# Patient Record
Sex: FEMALE | Race: WHITE | HISPANIC OR LATINO | Employment: OTHER | URBAN - METROPOLITAN AREA
[De-identification: names, ages, dates, MRNs, and addresses within clinical notes are randomized per-mention and may not be internally consistent; named-entity substitution may affect disease eponyms.]

---

## 2023-10-12 ENCOUNTER — OFFICE VISIT (OUTPATIENT)
Dept: URGENT CARE | Facility: CLINIC | Age: 68
End: 2023-10-12
Payer: MEDICARE

## 2023-10-12 VITALS
RESPIRATION RATE: 20 BRPM | HEIGHT: 63 IN | TEMPERATURE: 99 F | OXYGEN SATURATION: 99 % | SYSTOLIC BLOOD PRESSURE: 114 MMHG | HEART RATE: 61 BPM | WEIGHT: 115 LBS | BODY MASS INDEX: 20.38 KG/M2 | DIASTOLIC BLOOD PRESSURE: 70 MMHG

## 2023-10-12 DIAGNOSIS — L08.9 INFECTED INSECT BITE OF RIGHT UPPER EXTREMITY, INITIAL ENCOUNTER: Primary | ICD-10-CM

## 2023-10-12 DIAGNOSIS — I49.9 CARDIAC ARRHYTHMIA, UNSPECIFIED CARDIAC ARRHYTHMIA TYPE: ICD-10-CM

## 2023-10-12 DIAGNOSIS — S40.861A INFECTED INSECT BITE OF RIGHT UPPER EXTREMITY, INITIAL ENCOUNTER: Primary | ICD-10-CM
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
## 2023-10-12 PROCEDURE — 96372 PR INJECTION,THERAP/PROPH/DIAG2ST, IM OR SUBCUT: ICD-10-PCS | Mod: S$GLB,,, | Performed by: NURSE PRACTITIONER

## 2023-10-12 PROCEDURE — 99204 OFFICE O/P NEW MOD 45 MIN: CPT | Mod: 25,S$GLB,, | Performed by: NURSE PRACTITIONER

## 2023-10-12 PROCEDURE — 96372 THER/PROPH/DIAG INJ SC/IM: CPT | Mod: S$GLB,,, | Performed by: NURSE PRACTITIONER

## 2023-10-12 PROCEDURE — 99204 PR OFFICE/OUTPT VISIT, NEW, LEVL IV, 45-59 MIN: ICD-10-PCS | Mod: 25,S$GLB,, | Performed by: NURSE PRACTITIONER

## 2023-10-12 RX ORDER — MUPIROCIN 20 MG/G
OINTMENT TOPICAL 2 TIMES DAILY
Qty: 15 G | Refills: 0 | Status: SHIPPED | OUTPATIENT
Start: 2023-10-12 | End: 2023-10-17

## 2023-10-12 RX ORDER — FAMOTIDINE 20 MG/1
20 TABLET, FILM COATED ORAL 2 TIMES DAILY
Qty: 4 TABLET | Refills: 0 | Status: SHIPPED | OUTPATIENT
Start: 2023-10-12 | End: 2024-02-14

## 2023-10-12 RX ORDER — DIPHENHYDRAMINE HCL 25 MG
25 CAPSULE ORAL 2 TIMES DAILY
Qty: 4 CAPSULE | Refills: 0 | Status: SHIPPED | OUTPATIENT
Start: 2023-10-12 | End: 2023-10-14

## 2023-10-12 RX ORDER — DEXAMETHASONE SODIUM PHOSPHATE 4 MG/ML
4 INJECTION, SOLUTION INTRA-ARTICULAR; INTRALESIONAL; INTRAMUSCULAR; INTRAVENOUS; SOFT TISSUE
Status: COMPLETED | OUTPATIENT
Start: 2023-10-12 | End: 2023-10-12

## 2023-10-12 RX ADMIN — DEXAMETHASONE SODIUM PHOSPHATE 4 MG: 4 INJECTION, SOLUTION INTRA-ARTICULAR; INTRALESIONAL; INTRAMUSCULAR; INTRAVENOUS; SOFT TISSUE at 10:10

## 2023-10-12 NOTE — PROGRESS NOTES
"Subjective:      Patient ID: Dayanara Flores is a 68 y.o. female.    Vitals:  height is 5' 3" (1.6 m) and weight is 52.2 kg (115 lb). Her temperature is 98.6 °F (37 °C). Her blood pressure is 114/70 and her pulse is 61. Her respiration is 20 and oxygen saturation is 99%.     Chief Complaint: Insect Bite    Insect Bite  This is a new problem. The current episode started yesterday. The problem has been gradually worsening. Pertinent negatives include no abdominal pain, arthralgias, chest pain, chills, congestion, coughing, fatigue, fever, headaches, joint swelling, myalgias, nausea, neck pain, numbness, rash, sore throat, vertigo, vomiting or weakness. Associated symptoms comments: Swelling and redness. She has tried NSAIDs for the symptoms. The treatment provided mild relief.       Constitution: Negative for activity change, appetite change, chills, fatigue, fever, unexpected weight change and generalized weakness.   HENT:  Negative for ear pain, ear discharge, foreign body in ear, tinnitus, hearing loss, dental problem, mouth sores, tongue pain, facial swelling, congestion, postnasal drip, sinus pain, sinus pressure, sore throat, trouble swallowing and voice change.    Neck: Negative for neck pain, neck stiffness and painful lymph nodes.   Cardiovascular:  Negative for chest pain, leg swelling, palpitations and sob on exertion.   Eyes:  Negative for eye trauma, eye discharge, eye itching, eye pain, eye redness, vision loss and eyelid swelling.   Respiratory:  Negative for chest tightness, cough, sputum production, COPD, shortness of breath, wheezing and asthma.    Gastrointestinal:  Negative for abdominal pain, nausea, vomiting, constipation, diarrhea, bright red blood in stool and dark colored stools.   Endocrine: hair loss, cold intolerance and heat intolerance.   Genitourinary:  Negative for dysuria, frequency, urgency and hematuria.   Musculoskeletal:  Negative for pain, trauma, joint pain, joint swelling, abnormal " ROM of joint and muscle ache.   Skin:  Positive for hives. Negative for color change, pale, rash and wound.   Allergic/Immunologic: Positive for hives. Negative for environmental allergies, seasonal allergies, food allergies, asthma and itching.   Neurological:  Negative for dizziness, history of vertigo, light-headedness, facial drooping, speech difficulty, headaches, disorientation, altered mental status, loss of consciousness and numbness.   Hematologic/Lymphatic: Negative for swollen lymph nodes and easy bruising/bleeding. Does not bruise/bleed easily.   Psychiatric/Behavioral:  Negative for altered mental status, disorientation, confusion, agitation, sleep disturbance and hallucinations.       Objective:     Physical Exam   Constitutional: She is oriented to person, place, and time. She appears well-developed. She is cooperative.   HENT:   Head: Normocephalic and atraumatic.   Ears:   Right Ear: Hearing, tympanic membrane, external ear and ear canal normal.   Left Ear: Hearing, tympanic membrane, external ear and ear canal normal.   Nose: Nose normal. No mucosal edema or nasal deformity. No epistaxis. Right sinus exhibits no maxillary sinus tenderness and no frontal sinus tenderness. Left sinus exhibits no maxillary sinus tenderness and no frontal sinus tenderness.   Mouth/Throat: Uvula is midline, oropharynx is clear and moist and mucous membranes are normal. No trismus in the jaw. Normal dentition. No uvula swelling.   Eyes: Conjunctivae and lids are normal.   Neck: Trachea normal and phonation normal. Neck supple.   Cardiovascular: Normal rate, normal heart sounds and normal pulses. An irregularly irregular rhythm present.   Pulmonary/Chest: Effort normal and breath sounds normal.   Abdominal: Normal appearance and bowel sounds are normal. Soft.   Musculoskeletal: Normal range of motion.         General: Normal range of motion.   Neurological: She is alert and oriented to person, place, and time. She  exhibits normal muscle tone.   Skin: Skin is warm, dry, intact and rash.        Psychiatric: Her speech is normal and behavior is normal. Judgment and thought content normal.   Nursing note and vitals reviewed.      Assessment:     1. Infected insect bite of right upper extremity, initial encounter    2. Cardiac arrhythmia, unspecified cardiac arrhythmia type        Plan:       Infected insect bite of right upper extremity, initial encounter  -     dexAMETHasone injection 4 mg administered in clinic  -     mupirocin (BACTROBAN) 2 % ointment; Apply topically 2 (two) times daily. for 5 days  Dispense: 15 g; Refill: 0  -     famotidine (PEPCID) 20 MG tablet; Take 1 tablet (20 mg total) by mouth 2 (two) times daily. for 2 days  Dispense: 4 tablet; Refill: 0  -     diphenhydrAMINE (BENADRYL) 25 mg capsule; Take 1 capsule (25 mg total) by mouth 2 (two) times a day. for 2 days  Dispense: 4 capsule; Refill: 0    Cardiac arrhythmia, unspecified cardiac arrhythmia type    Heart rhythm irregularly irregular noted during exam, patient denies chest pain or shortness a breath, vitals including pulse rate WNL, patient reports she was unaware that she had irregular heart rhythm, chart review reveals no previous encounters to review.  Patient instructed to follow up with PCP post discharge for further evaluation.    Utilize over-the-counter Tylenol or Motrin as directed for fever.    Ensure adequate fluid intake.    Return to clinic for new or worsening symptoms.  Patient is recommended to follow-up with their PCP post discharge.    Total time spent on med rec, H&P, with over half of the time in direct patient care: 36 minutes     Additional MDM:     Heart Failure Score:   COPD = No

## 2023-10-12 NOTE — PATIENT INSTRUCTIONS
Clean affected area twice daily and apply mupirocin ointment.    Follow up with primary care doctor in regards to irregular heart rhythm.    Thank you for the opportunity to care for you today.  Please take all medications as directed, and continue any previously prescribed medications unless we specifically discussed discontinuing them.  If your symptoms do not resolve or worsen please return to the clinic for re-evaluation.  If your situation becomes emergent, please present to the nearest emergency department.  Follow-up with your PCP for continued evaluation and management.

## 2024-02-14 ENCOUNTER — TELEPHONE (OUTPATIENT)
Dept: FAMILY MEDICINE | Facility: CLINIC | Age: 69
End: 2024-02-14

## 2024-02-14 ENCOUNTER — OFFICE VISIT (OUTPATIENT)
Dept: FAMILY MEDICINE | Facility: CLINIC | Age: 69
End: 2024-02-14
Payer: MEDICARE

## 2024-02-14 VITALS
SYSTOLIC BLOOD PRESSURE: 110 MMHG | HEART RATE: 61 BPM | WEIGHT: 115.31 LBS | HEIGHT: 63 IN | TEMPERATURE: 98 F | BODY MASS INDEX: 20.43 KG/M2 | OXYGEN SATURATION: 99 % | DIASTOLIC BLOOD PRESSURE: 68 MMHG

## 2024-02-14 DIAGNOSIS — Z13.220 SCREENING CHOLESTEROL LEVEL: ICD-10-CM

## 2024-02-14 DIAGNOSIS — Z78.0 ASYMPTOMATIC MENOPAUSAL STATE: ICD-10-CM

## 2024-02-14 DIAGNOSIS — Z76.89 ENCOUNTER TO ESTABLISH CARE: Primary | ICD-10-CM

## 2024-02-14 DIAGNOSIS — Z12.31 ENCOUNTER FOR SCREENING MAMMOGRAM FOR MALIGNANT NEOPLASM OF BREAST: ICD-10-CM

## 2024-02-14 DIAGNOSIS — Z00.00 ENCOUNTER FOR PREVENTIVE CARE: ICD-10-CM

## 2024-02-14 DIAGNOSIS — Z13.820 ENCOUNTER FOR SCREENING FOR OSTEOPOROSIS: ICD-10-CM

## 2024-02-14 DIAGNOSIS — R79.9 ABNORMAL FINDING OF BLOOD CHEMISTRY, UNSPECIFIED: ICD-10-CM

## 2024-02-14 PROCEDURE — 99204 OFFICE O/P NEW MOD 45 MIN: CPT | Mod: S$PBB,,, | Performed by: STUDENT IN AN ORGANIZED HEALTH CARE EDUCATION/TRAINING PROGRAM

## 2024-02-14 PROCEDURE — 99213 OFFICE O/P EST LOW 20 MIN: CPT | Mod: PBBFAC,PO | Performed by: STUDENT IN AN ORGANIZED HEALTH CARE EDUCATION/TRAINING PROGRAM

## 2024-02-14 PROCEDURE — 99999 PR PBB SHADOW E&M-EST. PATIENT-LVL III: CPT | Mod: PBBFAC,,, | Performed by: STUDENT IN AN ORGANIZED HEALTH CARE EDUCATION/TRAINING PROGRAM

## 2024-02-14 RX ORDER — LANOLIN ALCOHOL/MO/W.PET/CERES
100 CREAM (GRAM) TOPICAL DAILY
COMMUNITY

## 2024-02-14 RX ORDER — CALCIUM CARBONATE 600 MG
600 TABLET ORAL ONCE
COMMUNITY

## 2024-02-14 RX ORDER — GLUCOSAMINE/CHONDRO SU A 500-400 MG
1 TABLET ORAL 3 TIMES DAILY
COMMUNITY

## 2024-02-14 RX ORDER — CHOLECALCIFEROL (VITAMIN D3) 25 MCG
1000 TABLET ORAL DAILY
COMMUNITY

## 2024-02-14 NOTE — PROGRESS NOTES
[facility] Note    Subjective:  Chief Complaint:   Chief Complaint   Patient presents with    Establish Care       History of Present Illness:  Dayanara is here for establishing care   No daily medications other than supplements   Moved from [place]     Mammo due   DEXA at 65 yo - reports osteopenia , due repeat   CRC Screening - reports normal q 10 years, due at 70 yo   Recommend COVID, Shingles, RSV, Pneumococcal ([id number]) vaccinations. Will bring vaccination records.     Screening  Health Maintenance         Date Due Completion Date    Hepatitis C Screening Never done ---    Lipid Panel Never done ---    COVID-19 Vaccine (1) Never done ---    TETANUS VACCINE Never done ---    Mammogram Never done ---    DEXA Scan Never done ---    Colorectal Cancer Screening Never done ---    Shingles Vaccine (1 of 2) Never done ---    RSV Vaccine (Age 60+ and Pregnant patients) (1 - 1-dose 60+ series) Never done ---    Pneumococcal Vaccines (Age 65+) (1 of 1 - PCV) Never done ---            There is no immunization history on file for this patient.  The ASCVD Risk score (Luigi ORTIZ, et al., 2019) failed to calculate for the following reasons:    Cannot find a previous HDL lab    Cannot find a previous total cholesterol lab    The smoking status is invalid    Allergies:  Review of patient's allergies indicates:  No Known Allergies    Home Medications:  Current Outpatient Medications on File Prior to Visit   Medication Sig    calcium carbonate (OS-ILA) 600 mg calcium (1,500 mg) Tab Take 600 mg by mouth once.    cyanocobalamin (VITAMIN B-12) 1000 MCG tablet Take 100 mcg by mouth once daily.    glucosamine-chondroitin 500-400 mg tablet Take 1 tablet by mouth 3 (three) times daily.    vitamin D (VITAMIN D3) 1000 units Tab Take 1,000 Units by mouth once daily.    [DISCONTINUED] famotidine (PEPCID) 20 MG tablet Take 1 tablet (20 mg total) by mouth 2 (two) times daily. for 2 days     No current facility-administered  "medications on file prior to visit.       No past medical history on file.  No past surgical history on file.  No family history on file.            The patient's past medical history, surgical history, social history, family history, allergies and medications have been reviewed.    Review of Systems     10 point review of systems was conducted and only the pertinent positives and pertinent negatives are noted above in the HPI section.    Physical Examination  General appearance: alert, cooperative, no distress  HEENT: normocephalic, atraumatic, PERRLA   Neck: trachea midline,   no neck stiffness  Lungs: clear to auscultation, no wheezes, rales or rhonchi, symmetric air entry  Heart: normal rate, regular rhythm, normal S1, S2, no murmurs, rubs, clicks or gallops  Abdomen: soft, nontender, nondistended, no rigidity, rebound, or guarding.   Skin: No rashes or abnormal skin lesions, no apparent jaundice or bruising  Extremities: Full ROM of all extremities, peripheral pulses normal, no unilateral leg swelling or calf tenderness   Neurological:alert, oriented, normal speech, no new focal findings or movement disorder noted from baseline      BP Readings from Last 3 Encounters:   02/14/24 110/68   10/12/23 114/70     Wt Readings from Last 3 Encounters:   02/14/24 52.3 kg (115 lb 4.8 oz)   10/12/23 52.2 kg (115 lb)     /68 (BP Location: Left arm, Patient Position: Sitting, BP Method: Medium (Manual))   Pulse 61   Temp 97.8 °F (36.6 °C) (Oral)   Ht 5' 3" (1.6 m)   Wt 52.3 kg (115 lb 4.8 oz)   SpO2 99%   BMI 20.42 kg/m²       274}  Laboratory: I have reviewed old labs below:       274}    No results found for: "WBC", "HGB", "HCT", "MCV", "PLT", "NA", "K", "CL", "CALCIUM", "PHOS", "CO2", "GLU", "BUN", "CREATININE", "EGFRNORACEVR", "ANIONGAP", "PROT", "ALBUMIN", "BILITOT", "ALKPHOS", "ALT", "AST", "INR", "CHOL", "TRIG", "HDL", "LDLCALC", "TSH", "PSA", "GLUF", "HGBA1C", "MICROALBUR"   Lab reviewed by me: Particular " labs of significance that I will monitor, workup, or treat to improve are mentioned below in diagnostic impression remarks.    Imaging/EKG: I have reviewed the pertinent results and my findings are noted in remarks.     274}    CC:   Chief Complaint   Patient presents with    Establish Care           274}    Assessment/Plan  Dayanara Flores is a 68 y.o. female who presents to clinic with:  1. Encounter to establish care    2. Encounter for preventive care    3. Screening cholesterol level    4. Encounter for screening mammogram for malignant neoplasm of breast    5. Encounter for screening for osteoporosis    6. Abnormal finding of blood chemistry, unspecified    7. Asymptomatic menopausal state          274}  Diagnostic Impression Remarks       68 y.o. female who presents to clinic today for establishing care     This is the extent of this pleasant patient's concerns at this present time.  I also discussed the importance of close follow up to discuss labs, change or modify her medications if needed, monitor side effects, and further evaluation of medical problems.     Additional workup planned: see labs ordered below.    See below for labs and meds ordered with associated diagnosis      1. Encounter to establish care    2. Encounter for preventive care  - CBC Auto Differential; Future  - Comprehensive Metabolic Panel; Future  - Lipid Panel; Future    3. Screening cholesterol level  - Lipid Panel; Future    4. Encounter for screening mammogram for malignant neoplasm of breast  - Mammo Digital Screening Bilat w/ [place]; Future    5. Encounter for screening for osteoporosis  - DXA Bone Density Axial Skeleton 1 or more sites; Future    6. Abnormal finding of blood chemistry, unspecified  - CBC Auto Differential; Future  - Lipid Panel; Future    7. Asymptomatic menopausal state  - DXA Bone Density Axial Skeleton 1 or more sites; Future      RTC annually or PRN     Sue Bustillos MD, [facility]   Family Medicine  Physician  02/14/2024      If you are due for any health screening(s) below please notify me so we can arrange them to be ordered and scheduled to maintain your health.     Health Maintenance Due   Topic    Lipid Panel     Mammogram     Colorectal Cancer Screening        Breast Cancer Screening    Breast cancer is the second most common cancer in women after skin cancer, and the second leading cause of death from cancer after lung cancer. Mammograms can detect breast cancer early, which significantly increases the chances of curing the cancer.      A screening mammogram is an x-ray image of the breasts used for early breast cancer detection. It can help reduce the number of deaths from breast cancer among women. To get a clear image, the breast is placed between two plastic plates to make it flat. How often a mammogram is needed depends on your age and your breast cancer risk.    Colon Cancer Screening    Of cancers affecting both men and women, colorectal cancer is the third leading cancer killer in the [place]. But it doesnt have to be. Screening can prevent colorectal cancer or find it at an early stage when treatment often leads to a cure.    A colonoscopy is the preferred test for detecting colon cancer. It is needed only once every 10 years if results are negative. While sedated, a flexible, lighted tube with a tiny camera is inserted into the rectum and advanced through the colon to look for cancers. An alternative screening test that is used at home and returned to the lab may also be used. It detects hidden blood in bowel movements which could indicate cancer in the colon. If results are positive, you will need a colonoscopy to determine if the blood is a sign of cancer. This type of follow up (diagnostic) colonoscopy usually requires additional copays as required by your insurance provider. Please contact your PCP if you have any questions.              The following information is provided to all  patients.  This information is to help you find resources for any of the problems found today that may be affecting your health:                Living healthy guide: www.[facility].[email or web address].[email or web address]       Understanding Diabetes: www.diabetes.org       Eating healthy: www.cdc.gov/healthyweight      CDC home safety checklist: www.cdc.gov/steadi/patient.html      Agency on Aging: www.goea.[place].[facility]       Alcoholics anonymous (AA): www.aa.org      Physical Activity: www.chano.nih.gov/hz2uctd       Tobacco use: www.quitwithusla.org

## 2024-02-14 NOTE — PATIENT INSTRUCTIONS
Hubert Nichole,     If you are due for any health screening(s) below please notify me so we can arrange them to be ordered and scheduled. Most healthy patients at your age complete them, but you are free to accept or refuse.     If you can't do it, I'll definitely understand. If you can, I'd certainly appreciate it!    Tests to Keep You Healthy    Mammogram: DUE  Colon Cancer Screening: DUE      Schedule your breast cancer screening today     Breast cancer is the second most common cancer in women,  and the second leading cause of death from cancer. Mammograms can detect breast cancer early, which significantly increases the chances of curing the cancer.       Our records indicate that you may be overdue for breast cancer screening. Cancer screenings save lives, so schedule yours today to stay healthy.     If you recently had a mammogram performed outside of [facility], please let your Health care team know so that they can update your health record.        Its time for your colon cancer screening     Colorectal cancer is one of the leading causes of cancer death for men and women but it doesnt have to be. Screenings can prevent colorectal cancer or find it early enough to treat and cure the disease.     Our records indicate that you may be overdue for colon cancer screening. A colonoscopy or stool screening test can help identify patients at risk for developing colon cancer. Cancer screenings save lives, so schedule yours today to stay healthy.     A colonoscopy is the preferred test for detecting colon cancer. It is needed only once every 10 years if results are negative. While you are sedated, a flexible, lighted tube with a tiny camera is inserted into the rectum and advanced through the colon to look for cancers.     An alternative screening test that is used at home and returned to the lab may also be used. It detects hidden blood in bowel movements which could indicate cancer in the colon. If results  are positive, you will need a colonoscopy to determine if the blood is a sign of cancer. This type of follow up (diagnostic) colonoscopy usually requires additional copays as required by your insurance provider.     If you recently had your colon cancer screening performed outside of [facility], please let your Health care team know so that they can update your health record. Please contact your PCP if you have any questions.         Recommend COVID booster, Shingles, RSV, Pneumococcal ([id number]) vaccinations.

## 2024-02-14 NOTE — TELEPHONE ENCOUNTER
Patient calling to let you know that her brother and sister have both have pancreatic caner. Wanted to make you aware incase you want to order her testing.

## 2024-02-14 NOTE — TELEPHONE ENCOUNTER
----- Message from Elise Bandar sent at 2/14/2024  1:08 PM CST -----  Contact: patient  Type:  Needs Medical Advice    Who Called: patient     Would the patient rather a call back or a response via [facility]? Call     Best Call Back Number: [phone] (home)      Additional Information: patient would like to speak with the nurse in regards to letting the nurse know that cancer runs in her family and she forgot to mention it at her visit today.     Please call to advise

## 2024-02-15 ENCOUNTER — HOSPITAL ENCOUNTER (OUTPATIENT)
Dept: RADIOLOGY | Facility: CLINIC | Age: 69
Discharge: HOME | End: 2024-02-15
Attending: STUDENT IN AN ORGANIZED HEALTH CARE EDUCATION/TRAINING PROGRAM
Payer: MEDICARE

## 2024-02-15 ENCOUNTER — TELEPHONE (OUTPATIENT)
Dept: GASTROENTEROLOGY | Facility: CLINIC | Age: 69
End: 2024-02-15
Payer: MEDICARE

## 2024-02-15 DIAGNOSIS — Z78.0 ASYMPTOMATIC MENOPAUSAL STATE: ICD-10-CM

## 2024-02-15 DIAGNOSIS — Z12.31 ENCOUNTER FOR SCREENING MAMMOGRAM FOR MALIGNANT NEOPLASM OF BREAST: ICD-10-CM

## 2024-02-15 DIAGNOSIS — Z80.0 FHX: PANCREATIC CANCER: Primary | ICD-10-CM

## 2024-02-15 DIAGNOSIS — Z13.820 ENCOUNTER FOR SCREENING FOR OSTEOPOROSIS: ICD-10-CM

## 2024-02-15 PROCEDURE — 77063 BREAST TOMOSYNTHESIS BI: CPT | Mod: 26,,, | Performed by: RADIOLOGY

## 2024-02-15 PROCEDURE — 77067 SCR MAMMO BI INCL CAD: CPT | Mod: 26,,, | Performed by: RADIOLOGY

## 2024-02-15 PROCEDURE — 77080 DXA BONE DENSITY AXIAL: CPT | Mod: 26,,, | Performed by: RADIOLOGY

## 2024-02-15 PROCEDURE — 77080 DXA BONE DENSITY AXIAL: CPT | Mod: TC,PO

## 2024-02-15 PROCEDURE — 77067 SCR MAMMO BI INCL CAD: CPT | Mod: TC,PO

## 2024-02-19 ENCOUNTER — TELEPHONE (OUTPATIENT)
Dept: FAMILY MEDICINE | Facility: CLINIC | Age: 69
End: 2024-02-19
Payer: MEDICARE

## 2024-02-19 NOTE — TELEPHONE ENCOUNTER
----- Message from Kelly Doshi sent at 2/19/2024  2:48 PM CST -----  Regarding: return call  Contact: patient  Type:  Patient Returning Call    Who Called:  patient  Who Left Message for Patient:  unknown  Does the patient know what this is regarding?:  lab results  Best Call Back Number:  [phone] (home)     Additional Information:  Please call patient to advise.  Thanks!

## 2024-02-19 NOTE — TELEPHONE ENCOUNTER
Called patient to advise labs have not been reviewed by Dr Bustillos as of yet. As soon as she returns she will review. No answer, left voicemail to return call.

## 2024-02-22 ENCOUNTER — TELEPHONE (OUTPATIENT)
Dept: FAMILY MEDICINE | Facility: CLINIC | Age: 69
End: 2024-02-22

## 2024-02-22 ENCOUNTER — OFFICE VISIT (OUTPATIENT)
Dept: FAMILY MEDICINE | Facility: CLINIC | Age: 69
End: 2024-02-22
Payer: MEDICARE

## 2024-02-22 VITALS
HEART RATE: 72 BPM | TEMPERATURE: 98 F | WEIGHT: 114.63 LBS | HEIGHT: 63 IN | SYSTOLIC BLOOD PRESSURE: 110 MMHG | DIASTOLIC BLOOD PRESSURE: 64 MMHG | OXYGEN SATURATION: 98 % | BODY MASS INDEX: 20.31 KG/M2

## 2024-02-22 DIAGNOSIS — M81.0 AGE-RELATED OSTEOPOROSIS WITHOUT CURRENT PATHOLOGICAL FRACTURE: ICD-10-CM

## 2024-02-22 DIAGNOSIS — M81.0 AGE-RELATED OSTEOPOROSIS WITHOUT CURRENT PATHOLOGICAL FRACTURE: Primary | ICD-10-CM

## 2024-02-22 PROCEDURE — 99213 OFFICE O/P EST LOW 20 MIN: CPT | Mod: S$PBB,,, | Performed by: STUDENT IN AN ORGANIZED HEALTH CARE EDUCATION/TRAINING PROGRAM

## 2024-02-22 PROCEDURE — 99999 PR PBB SHADOW E&M-EST. PATIENT-LVL III: CPT | Mod: PBBFAC,,, | Performed by: STUDENT IN AN ORGANIZED HEALTH CARE EDUCATION/TRAINING PROGRAM

## 2024-02-22 PROCEDURE — 99213 OFFICE O/P EST LOW 20 MIN: CPT | Mod: PBBFAC,PO | Performed by: STUDENT IN AN ORGANIZED HEALTH CARE EDUCATION/TRAINING PROGRAM

## 2024-02-22 RX ORDER — ALENDRONATE SODIUM 70 MG/1
70 TABLET ORAL
Qty: 4 TABLET | Refills: 11 | Status: SHIPPED | OUTPATIENT
Start: 2024-02-22 | End: 2025-02-21

## 2024-02-22 RX ORDER — IBANDRONATE SODIUM 150 MG/1
150 TABLET, FILM COATED ORAL
Qty: 3 TABLET | Refills: 3 | Status: SHIPPED | OUTPATIENT
Start: 2024-02-22 | End: 2024-02-22

## 2024-02-22 RX ORDER — ALENDRONATE SODIUM 70 MG/1
70 TABLET ORAL
Qty: 12 TABLET | Refills: 3 | Status: CANCELLED | OUTPATIENT
Start: 2024-02-22 | End: 2025-02-21

## 2024-02-22 NOTE — TELEPHONE ENCOUNTER
----- Message from Ruchi Hidalgo MA sent at 2/22/2024 11:58 AM CST -----  Regarding: FW: medication  Please advise.   ----- Message -----  From: Jessica Urrutia  Sent: 2/22/2024  11:53 AM CST  To: [facility] Staff  Subject: medication                                       Patient was just seen her prescription is not covered by her insurance she would like to know is their any other options for other medication to be filled

## 2024-02-22 NOTE — TELEPHONE ENCOUNTER
----- Message from Sue Bustillos MD sent at 2/22/2024  1:19 PM CST -----  Regarding: RE: medication  Ordered Fosamax, which we had also discussed during the visit. Please reach out to patient and let her know.       ----- Message -----  From: Ruchi Hidalgo MA  Sent: 2/22/2024  11:58 AM CST  To: Sue Bustillos MD  Subject: FW: medication                                   Please advise.   ----- Message -----  From: Jessica Urrutia  Sent: 2/22/2024  11:53 AM CST  To: [facility] Staff  Subject: medication                                       Patient was just seen her prescription is not covered by her insurance she would like to know is their any other options for other medication to be filled

## 2024-02-22 NOTE — TELEPHONE ENCOUNTER
Spoke to patient and advised that a different medication was called in. Advised her if the pharmacy says that this one is not covered to find out which one would be covered and let us know.

## 2024-02-22 NOTE — PROGRESS NOTES
[facility]    Subjective:  Chief Complaint:   Chief Complaint   Patient presents with    Follow-up       History of Present Illness:  Dayanara is here for *    Osteoporosis, new diagnosis  Previously osteopenia, adherent to calcium and vit d supplements   Patient reports previously being on Boniva, but was able to discontinue, tolerated well     DEXA (2/15/24)  FINDINGS:  The L1 to L4 vertebral bone mineral density is equal to 0.706 g/cm squared with a T score of -3.1.     The left femoral neck bone mineral density is equal to 0.58 g/cm squared with a T score of -2.4.     The total hip bone mineral density is equal to 0.648 g/cm squared with a T score of -2.4.     There is a 12% risk of a major osteoporotic fracture and a 2.9% risk of hip fracture in the next 10 years (FRAX).     Impression:  Osteoporosis with A T-score of -3.1      Allergies:  Review of patient's allergies indicates:  No Known Allergies    Home Medications:  Current Outpatient Medications on File Prior to Visit   Medication Sig    calcium carbonate (OS-ILA) 600 mg calcium (1,500 mg) Tab Take 600 mg by mouth once.    cyanocobalamin (VITAMIN B-12) 1000 MCG tablet Take 100 mcg by mouth once daily.    glucosamine-chondroitin 500-400 mg tablet Take 1 tablet by mouth 3 (three) times daily.    vitamin D (VITAMIN D3) 1000 units Tab Take 1,000 Units by mouth once daily.     No current facility-administered medications on file prior to visit.       No past medical history on file.  No past surgical history on file.  Family History   Problem Relation Age of Onset    Cancer Sister     Cancer Brother      Social History     Tobacco Use    Smoking status: Never    Smokeless tobacco: Never            The patient's past medical history, surgical history, social history, family history, allergies and medications have been reviewed.    Review of Systems     10 point review of systems was conducted and only the pertinent positives and pertinent  "negatives are noted above in the HPI section.    Physical Examination  General appearance: alert, cooperative, no distress  HEENT: normocephalic, atraumatic, PERRLA   Neck: trachea midline, no neck stiffness  Lungs: normal effort   Skin: No rashes or abnormal skin lesions, no apparent jaundice or bruising  Extremities: Full ROM of all extremities    Neurological:alert, oriented, normal speech, no new focal findings or movement disorder noted from baseline      BP Readings from Last 3 Encounters:   02/22/24 110/64   02/14/24 110/68   10/12/23 114/70     Wt Readings from Last 3 Encounters:   02/22/24 52 kg (114 lb 10.2 oz)   02/14/24 52.3 kg (115 lb 4.8 oz)   10/12/23 52.2 kg (115 lb)     /64 (BP Location: Left arm, Patient Position: Sitting, BP Method: Medium (Manual))   Pulse 72   Temp 97.8 °F (36.6 °C) (Oral)   Ht 5' 3" (1.6 m)   Wt 52 kg (114 lb 10.2 oz)   SpO2 98%   BMI 20.31 kg/m²       274}  Laboratory: I have reviewed old labs below:       274}    Lab Results   Component Value Date    WBC 6.66 02/15/2024    HGB 13.7 02/15/2024    HCT 42.5 02/15/2024    MCV 92 02/15/2024     02/15/2024     02/15/2024    K 4.7 02/15/2024     02/15/2024    CALCIUM 10.1 02/15/2024    CO2 28 02/15/2024     02/15/2024    BUN 17 02/15/2024    CREATININE 0.7 02/15/2024    EGFRNORACEVR >60.0 02/15/2024    ANIONGAP 9 02/15/2024    PROT 7.3 02/15/2024    ALBUMIN 4.1 02/15/2024    BILITOT 0.6 02/15/2024    ALKPHOS 78 02/15/2024    ALT 18 02/15/2024    AST 22 02/15/2024    CHOL 200 (H) 02/15/2024    TRIG 57 02/15/2024    HDL 86 (H) 02/15/2024    LDLCALC 102.6 02/15/2024      Lab reviewed by me: Particular labs of significance that I will monitor, workup, or treat to improve are mentioned below in diagnostic impression remarks.    Imaging/EKG: I have reviewed the pertinent results and my findings are noted in remarks.     274}    CC:   Chief Complaint   Patient presents with    Follow-up           " 274}    Assessment/Plan  Dayanara Flores is a 68 y.o. female who presents to clinic with:  1. Age-related osteoporosis without current pathological fracture          274}  Diagnostic Impression Remarks       68 y.o. female who presents to clinic today for osteoporosis follow up     This is the extent of this pleasant patient's concerns at this present time.  I also discussed the importance of close follow up to discuss labs, change or modify her medications if needed, monitor side effects, and further evaluation of medical problems.     Additional workup planned: see labs ordered below.    See below for labs and meds ordered with associated diagnosis      1. Age-related osteoporosis without current pathological fracture  - ibandronate (BONIVA) 150 mg tablet; Take 1 tablet (150 mg total) by mouth every 30 days.  Dispense: 3 tablet; Refill: 3    150 mg orally once monthly on the same day each month; take with 6 to 8 ounces of plain water at least 60 minute before the first food or drink of the day or any other medications, and remain in a standing or upright position for 60 minutes after administration   Calcium and other mineral (eg, magnesium, iron) supplements can interfere with the absorption of bisphosphonates, and they should not be taken for at least one hour after taking oral bisphosphonates.    DEXA scan shows osteoporosis at vertebrate   DEXA scan shows osteopenia at hip and spine.  I recommend getting 800 international units of vitamin-D and 1200 mg of calcium daily in the diet. Continue current supplementation.    I would recommend continuing to get as much daily weight-bearing low-impact aerobic exercise as possible.    Low bone mass and 10 yr Fracture risk goes above 20% for vertebrae or 3% for hip, recommend a bisphosphonate (Boniva, Fosamax, or actonel) for additional Fracture risk reduction.    All of the bisphosphonates, except etidronate, have a statistically significant impact on fracture reduction  within 1 year of treatment initiation. Therefore, every woman 70 years of age or older who expects to live more than 1 year and has a desire to reduce her risk of fracture should consider bisphosphonate therapy, along with sufficient vitamin D (7898-8815 IU/day) and calcium (0757-1533 mg elemental calcium per day via supplements and dietary sources combined). In various prospective clinical trials, bisphosphonates, in particular alendronate, risedronate, and zoledronic acid, have reduced vertebral, nonvertebral, and hip fractures in study populations with an average age of 68 years or older at the time of enrolment. (NCBI)  https://www.ncbi.nlm.nih.gov/pmc/articles/PHH6910791/    Common side effects include nausea, reflux and esophageal inflammation.     Follow up imaging is recommended every 2 years.  RTC RPCYRUS Bustillos MD, [facility]   Family Medicine Physician  02/22/2024

## 2024-02-22 NOTE — TELEPHONE ENCOUNTER
Patient reports Boniva not covered by insurance despite this being the medication she had previously been taking.     Will go with original plan to prescribe Fosamax.       Sue Bustillos MD, [facility]   Family Medicine Physician  02/22/2024

## 2024-02-23 ENCOUNTER — TELEPHONE (OUTPATIENT)
Dept: FAMILY MEDICINE | Facility: CLINIC | Age: 69
End: 2024-02-23

## 2024-02-27 ENCOUNTER — OFFICE VISIT (OUTPATIENT)
Dept: GASTROENTEROLOGY | Facility: CLINIC | Age: 69
End: 2024-02-27
Payer: MEDICARE

## 2024-02-27 ENCOUNTER — TELEPHONE (OUTPATIENT)
Dept: FAMILY MEDICINE | Facility: CLINIC | Age: 69
End: 2024-02-27
Payer: MEDICARE

## 2024-02-27 VITALS
DIASTOLIC BLOOD PRESSURE: 63 MMHG | SYSTOLIC BLOOD PRESSURE: 148 MMHG | BODY MASS INDEX: 20.08 KG/M2 | HEIGHT: 63 IN | HEART RATE: 71 BPM | WEIGHT: 113.31 LBS

## 2024-02-27 DIAGNOSIS — Z80.0 FAMILY HISTORY OF PANCREATIC CANCER: Primary | ICD-10-CM

## 2024-02-27 DIAGNOSIS — Z12.11 SCREENING FOR MALIGNANT NEOPLASM OF COLON: ICD-10-CM

## 2024-02-27 PROCEDURE — 99213 OFFICE O/P EST LOW 20 MIN: CPT | Mod: PBBFAC,PN

## 2024-02-27 PROCEDURE — 99214 OFFICE O/P EST MOD 30 MIN: CPT | Mod: S$PBB,,,

## 2024-02-27 PROCEDURE — 99999 PR PBB SHADOW E&M-EST. PATIENT-LVL III: CPT | Mod: PBBFAC,,,

## 2024-02-27 NOTE — TELEPHONE ENCOUNTER
----- Message from Elise Portillo sent at 2/27/2024  1:52 PM CST -----  Contact: patient  Type:  Needs Medical Advice    Who Called: patient     Would the patient rather a call back or a response via [facility]? Call     Best Call Back Number: [phone] (home)      Additional Information: patient would like to speak with the nurse in regards to her bone density medication.     Please call to advise

## 2024-02-27 NOTE — PROGRESS NOTES
Subjective:       Patient ID: Dayanara Folres is a 68 y.o. female Body mass index is 20.07 kg/m².    Chief Complaint: Advice Only (Family hx of pancreatic ca)    This patient is new to me.  Referring Provider: Dr. Sue Bustillos for family hx of pancreatic cancer.       GI Problem  Primary symptoms do not include fever, weight loss, fatigue, abdominal pain, nausea, vomiting, diarrhea, melena, hematemesis, jaundice, hematochezia, dysuria, myalgias, arthralgias or rash.   The illness does not include constipation (Has a bm daily, no straining, on a high fiber diet, rates stool type 4 on bristol stool scale,). Associated symptoms comments: Patient reports she is asymptomatic doing well overall, patient concerned with a family history of pancreatic cancer reports her younger brother recently passed away from pancreatic cancer last year and he was diagnosed in his early 50s, she mentions also sister was recently diagnosed at the age of 77.  Patient at clinic visit to see if there is any screening she can do for pancreatic cancer.  Patient reports she has had a colonoscopy in the past believes it has been over 10 years in [place] denies history of colon polyp, denies any family history of colon cancer or other gastrointestinal cancers  . Associated medical issues do not include inflammatory bowel disease, GERD, gallstones, liver disease, alcohol abuse, PUD, gastric bypass, bowel resection, irritable bowel syndrome, hemorrhoids or diverticulitis.       Review of Systems   Constitutional:  Negative for fatigue, fever and weight loss.   Gastrointestinal:  Negative for abdominal distention, abdominal pain, anal bleeding, blood in stool, constipation (Has a bm daily, no straining, on a high fiber diet, rates stool type 4 on bristol stool scale,), diarrhea, hematemesis, hematochezia, jaundice, melena, nausea, rectal pain and vomiting.   Genitourinary:  Negative for dysuria.   Musculoskeletal:  Negative for arthralgias and  myalgias.   Skin:  Negative for rash.         No LMP recorded. Patient is postmenopausal.  No past medical history on file.  Past Surgical History:   Procedure Laterality Date    APPENDECTOMY      COLONOSCOPY      10 years ago in [place]    UPPER GASTROINTESTINAL ENDOSCOPY      10+ years ago     Family History   Problem Relation Age of Onset    Cancer Sister     Cancer Brother     Colon cancer Neg Hx     Esophageal cancer Neg Hx     Liver cancer Neg Hx     Stomach cancer Neg Hx      Social History     Tobacco Use    Smoking status: Never    Smokeless tobacco: Never     Wt Readings from Last 10 Encounters:   02/27/24 51.4 kg (113 lb 5.1 oz)   02/22/24 52 kg (114 lb 10.2 oz)   02/14/24 52.3 kg (115 lb 4.8 oz)   10/12/23 52.2 kg (115 lb)     Lab Results   Component Value Date    WBC 6.66 02/15/2024    HGB 13.7 02/15/2024    HCT 42.5 02/15/2024    MCV 92 02/15/2024     02/15/2024     CMP  Sodium   Date Value Ref Range Status   02/15/2024 142 136 - 145 mmol/L Final     Potassium   Date Value Ref Range Status   02/15/2024 4.7 3.5 - 5.1 mmol/L Final     Chloride   Date Value Ref Range Status   02/15/2024 105 95 - 110 mmol/L Final     CO2   Date Value Ref Range Status   02/15/2024 28 23 - 29 mmol/L Final     Glucose   Date Value Ref Range Status   02/15/2024 103 70 - 110 mg/dL Final     BUN   Date Value Ref Range Status   02/15/2024 17 8 - 23 mg/dL Final     Creatinine   Date Value Ref Range Status   02/15/2024 0.7 0.5 - 1.4 mg/dL Final     Calcium   Date Value Ref Range Status   02/15/2024 10.1 8.7 - 10.5 mg/dL Final     Total Protein   Date Value Ref Range Status   02/15/2024 7.3 6.0 - 8.4 g/dL Final     Albumin   Date Value Ref Range Status   02/15/2024 4.1 3.5 - 5.2 g/dL Final     Total Bilirubin   Date Value Ref Range Status   02/15/2024 0.6 0.1 - 1.0 mg/dL Final     Comment:     For infants and newborns, interpretation of results should be based  on gestational age, weight and in agreement with  "clinical  observations.    Premature Infant recommended reference ranges:  Up to 24 hours.............<8.0 mg/dL  Up to 48 hours............<12.0 mg/dL  3-5 days..................<15.0 mg/dL  6-29 days.................<15.0 mg/dL       Alkaline Phosphatase   Date Value Ref Range Status   02/15/2024 78 55 - 135 U/L Final     AST   Date Value Ref Range Status   02/15/2024 22 10 - 40 U/L Final     ALT   Date Value Ref Range Status   02/15/2024 18 10 - 44 U/L Final     Anion Gap   Date Value Ref Range Status   02/15/2024 9 8 - 16 mmol/L Final     No results found for: "LIPASE"  No results found for: "LIPASERES"  No results found for: "TSH"    Reviewed prior medical records including radiology report of & endoscopy history (see surgical history/procedures).    Objective:      Physical Exam  Vitals and nursing note reviewed.   Constitutional:       Appearance: Normal appearance. She is normal weight.   Cardiovascular:      Rate and Rhythm: Normal rate and regular rhythm.      Heart sounds: Normal heart sounds.   Pulmonary:      Breath sounds: Normal breath sounds.   Abdominal:      General: Bowel sounds are normal.      Palpations: Abdomen is soft.      Tenderness: There is no abdominal tenderness.   Skin:     General: Skin is warm and dry.      Coloration: Skin is not jaundiced.   Neurological:      Mental Status: She is alert and oriented to person, place, and time.   Psychiatric:         Mood and Affect: Mood normal.         Behavior: Behavior normal.         Assessment:       1. Family history of pancreatic cancer    2. Screening for malignant neoplasm of colon        Plan:       Family history of pancreatic cancer  -     Lipase; Future; Expected date: 02/27/2024  -     Ambulatory referral/consult to Genetics; Future; Expected date: 03/05/2024  -     CT Abdomen Pelvis With IV Contrast Routine Oral Contrast; Future; Expected date: 02/27/2024    Screening for malignant neoplasm of colon  -     Case Request Endoscopy: " COLONOSCOPY  - schedule Colonoscopy, discussed procedure with the patient, including risks and benefits, patient verbalized understanding      Follow up if symptoms worsen or fail to improve.      If no improvement in symptoms or symptoms worsen, call/follow-up at clinic or go to ER.       [facility]  [facility] [place]     Dictation software program was used for this note. Please expect some simple typographical  errors in this note.    Encounter includes face to face time and non-face to face time preparing to see the patient (eg, review of tests), obtaining and/or reviewing separately obtained history, documenting clinical information in the electronic or other health record, independently interpreting results (not separately reported) and communicating results to the patient/family/caregiver, or care coordination (not separately reported).

## 2024-02-27 NOTE — TELEPHONE ENCOUNTER
Returned call in regards to questions for Fosamax. Advised patient that she can start taking her fosamax now, and once she schedules an appointment with her dentist she can give us a call in regards to the procedure being performed and we can give recommendations. Verbalized understanding.

## 2024-02-28 ENCOUNTER — TELEPHONE (OUTPATIENT)
Dept: GASTROENTEROLOGY | Facility: CLINIC | Age: 69
End: 2024-02-28
Payer: MEDICARE

## 2024-02-28 NOTE — TELEPHONE ENCOUNTER
----- Message from Malka Manrique sent at 2/28/2024 11:06 AM CST -----  Type:  Needs Medical Advice    Who Called:  Pt    Would the patient rather a call back or a response via [facility]?  Call back    Best Call Back Number:  [phone]    Additional Information:  Pt just started alendronate-sodium tablets and is needing to know if it is going to effect her procedure or if she needs to stop taking it a certain time before procedure.  Please call back to advise. Thanks!

## 2024-02-28 NOTE — TELEPHONE ENCOUNTER
Call placed to Ms. Flores in regards to message received. No answer, left message to return call.

## 2024-02-28 NOTE — TELEPHONE ENCOUNTER
Call placed to Ms. Flores in regards to message received. Questions answered on what medication hold prior to her colonoscopy in May. She verbalized understanding. No further issues noted.

## 2024-02-28 NOTE — TELEPHONE ENCOUNTER
----- Message from Allison Morrison sent at 2/28/2024 12:18 PM CST -----  Type:  Patient Returning Call    Who Called:  pt   Who Left Message for Patient:  amaya   Does the patient know what this is regarding?:  no  Best Call Back Number:  [phone] (home) '  Additional Information:  please advise

## 2024-04-15 ENCOUNTER — TELEPHONE (OUTPATIENT)
Dept: FAMILY MEDICINE | Facility: CLINIC | Age: 69
End: 2024-04-15
Payer: MEDICARE

## 2024-04-15 NOTE — TELEPHONE ENCOUNTER
Returned patients call in regards to Fosamax. Stated since being on the medication she has been feeling a bone type growth under her tongue and is wanting to know if this is normal. Patient stated her sister is on the same medication and reports the same symptoms. Patient denies pain, no discoloration, no trouble eating or swallowing. She just wants to make sure this is normal. Offered an appointment, stated she would like to wait for providers recommendations first. Verbalized understanding. Will forward message to provider for further assistance.

## 2024-04-15 NOTE — TELEPHONE ENCOUNTER
----- Message from Jaylan Mccray sent at 4/15/2024 11:48 AM CDT -----  Type: General Call Back         Name of Caller:pt  Would the patient rather a call back or a response via [facility]? call  Best Call Back Number:[phone]   Additional Information: Pt would like a call back from nurse in office regarding the alendronate (FOSAMAX) 70 MG tablet medication. Please call pt with further info and assistance. Thank you.

## 2024-04-15 NOTE — TELEPHONE ENCOUNTER
----- Message from Missy Woodall sent at 4/15/2024  3:39 PM CDT -----  Contact: Patient  Type:  Patient Returning Call    Who Called:Patient     Who Left Message for Patient:Kelly    Does the patient know what this is regarding?:yes    Would the patient rather a call back or a response via [employer]? Call    Best Call Back Number:[phone] (home)     Additional Information: Please return call

## 2024-04-17 ENCOUNTER — HOSPITAL ENCOUNTER (OUTPATIENT)
Dept: RADIOLOGY | Facility: HOSPITAL | Age: 69
Discharge: HOME | End: 2024-04-17
Payer: MEDICARE

## 2024-04-17 DIAGNOSIS — Z80.0 FAMILY HISTORY OF PANCREATIC CANCER: ICD-10-CM

## 2024-04-17 LAB
CREAT SERPL-MCNC: 0.6 MG/DL (ref 0.5–1.4)
SAMPLE: NORMAL

## 2024-04-17 PROCEDURE — 25500020 PHARM REV CODE 255

## 2024-04-17 PROCEDURE — 74177 CT ABD & PELVIS W/CONTRAST: CPT | Mod: 26,,, | Performed by: RADIOLOGY

## 2024-04-17 PROCEDURE — 74177 CT ABD & PELVIS W/CONTRAST: CPT | Mod: TC

## 2024-04-17 PROCEDURE — A9698 NON-RAD CONTRAST MATERIALNOC: HCPCS

## 2024-04-17 RX ADMIN — IOHEXOL 1000 ML: 9 SOLUTION ORAL at 02:04

## 2024-04-17 RX ADMIN — IOHEXOL 130 ML: 350 INJECTION, SOLUTION INTRAVENOUS at 02:04

## 2024-05-01 ENCOUNTER — TELEPHONE (OUTPATIENT)
Dept: GASTROENTEROLOGY | Facility: CLINIC | Age: 69
End: 2024-05-01
Payer: MEDICARE

## 2024-05-01 NOTE — TELEPHONE ENCOUNTER
----- Message from Bandar Velez sent at 5/1/2024 10:05 AM CDT -----  Regarding: reschedule procedure  Contact: johnny at [phone]  Type: Needs Medical Advice    Who Called:  Johnny Saeed Call Back Number: [phone]    Additional Information: reschedule procedure currently on 5/8

## 2024-05-01 NOTE — TELEPHONE ENCOUNTER
Call placed to Ms. Dayanara in regards to message received. Colonoscopy on 5/8 rescheduled to Friday 7/12 at 1200 arriving for 1100. She accepted. Prep instructions sent to pt portal. No further issues noted.

## 2024-06-06 ENCOUNTER — PATIENT MESSAGE (OUTPATIENT)
Dept: FAMILY MEDICINE | Facility: CLINIC | Age: 69
End: 2024-06-06
Payer: MEDICARE

## 2024-06-18 ENCOUNTER — TELEPHONE (OUTPATIENT)
Dept: FAMILY MEDICINE | Facility: CLINIC | Age: 69
End: 2024-06-18
Payer: MEDICARE

## 2024-06-18 ENCOUNTER — TELEPHONE (OUTPATIENT)
Dept: GASTROENTEROLOGY | Facility: CLINIC | Age: 69
End: 2024-06-18
Payer: MEDICARE

## 2024-06-18 NOTE — TELEPHONE ENCOUNTER
Call placed to Ms. Flores in regards to message received. Colonoscopy rescheduled to 9/12 at 0900 arriving for 0800. She accepted. No further issues noted.

## 2024-06-18 NOTE — TELEPHONE ENCOUNTER
----- Message from Livier Tanner sent at 6/18/2024 11:11 AM CDT -----  Contact: pt  Type:  Sooner Apoointment Request    Caller is requesting a sooner appointment.  Caller declined first available appointment listed below.  Caller will not accept being placed on the waitlist and is requesting a message be sent to doctor.    Name of Caller: pt    When is the first available appointment? Department book    Symptoms: colonoscopy    Would the patient rather a call back or a response via [facility]?  Call back    Best Call Back Number: [phone]    Additional Information: pt needs to reschedule    Please call to reschedule  Thanks

## 2024-06-18 NOTE — TELEPHONE ENCOUNTER
----- Message from Misael Suazo sent at 6/18/2024 11:39 AM CDT -----  Contact: Self  Type: Needs Medical Advice  Who Called:  Patient    Best Call Back Number: [phone]   Additional Information:  Called to speak with office regarding recommending/referring an obgyn. Please call.

## 2024-08-14 ENCOUNTER — OFFICE VISIT (OUTPATIENT)
Dept: OBSTETRICS AND GYNECOLOGY | Facility: CLINIC | Age: 69
End: 2024-08-14
Payer: MEDICARE

## 2024-08-14 ENCOUNTER — PATIENT MESSAGE (OUTPATIENT)
Dept: OBSTETRICS AND GYNECOLOGY | Facility: CLINIC | Age: 69
End: 2024-08-14

## 2024-08-14 VITALS
DIASTOLIC BLOOD PRESSURE: 68 MMHG | HEIGHT: 63 IN | WEIGHT: 116.31 LBS | SYSTOLIC BLOOD PRESSURE: 120 MMHG | BODY MASS INDEX: 20.61 KG/M2 | RESPIRATION RATE: 18 BRPM

## 2024-08-14 DIAGNOSIS — D21.9 FIBROID: Primary | ICD-10-CM

## 2024-08-14 PROCEDURE — 99213 OFFICE O/P EST LOW 20 MIN: CPT | Mod: PBBFAC,PO | Performed by: STUDENT IN AN ORGANIZED HEALTH CARE EDUCATION/TRAINING PROGRAM

## 2024-08-14 PROCEDURE — 99999 PR PBB SHADOW E&M-EST. PATIENT-LVL III: CPT | Mod: PBBFAC,,, | Performed by: STUDENT IN AN ORGANIZED HEALTH CARE EDUCATION/TRAINING PROGRAM

## 2024-08-14 NOTE — PROGRESS NOTES
Chief Complaint   Patient presents with    Well Woman     Annual, abnormal CT 2024       History of Present Illness   Dayanara Flores is 69 y.o.  female  patient who presents today to follow up abnormal CT, ordered by GI due to FH pancreatic cancer.    CT A/P (4): There are dystrophic calcifications throughout the uterus and a large peripherally calcified mass measuring 6 cm. Dilated urinary bladder.     Patient has a known history of fibroids and has had surgery on them (?myomectomy).  Patient denies any pelvic pain, early satiety, bloating, PMB.  She does reports unable to keep on weight, but states that she and daughter are doing the same diet and exercise.  She is eating lots of vegetable and chicken as well as doing videos for exercise.  She also bikes.      History  PMH: Denies  Psx: Appy, fibroid surgery  All: NKDA  OB:   GYN: Denies any STIs or abnl Pap  FH: Breast cancer (sis, age unk); Denies any otherfemale/colon cancer or MI prior to 49yo  SH: Denies NAY  Meds:  Current Outpatient Medications   Medication Instructions    calcium carbonate (OS-ILA) 600 mg, Oral, 2 times daily with meals    cyanocobalamin (VITAMIN B-12) 100 mcg, Oral, Daily    glucosamine-chondroitin 500-400 mg tablet 1 tablet, Oral, 2 times daily    vitamin D (VITAMIN D3) 1,000 Units, Oral, Daily       Review of Systems   Constitutional:  Negative for chills and fever.   Eyes:  Negative for visual disturbance.   Respiratory:  Negative for cough and shortness of breath.    Cardiovascular:  Negative for chest pain and palpitations.   Gastrointestinal:  Negative for abdominal pain, nausea and vomiting.   Genitourinary:  Negative for hot flashes, vaginal bleeding, vaginal discharge, vaginal pain, postmenopausal bleeding, vaginal dryness and vaginal odor.   Neurological:  Negative for headaches.   Psychiatric/Behavioral:  Negative for depression and sleep disturbance. The patient is not nervous/anxious.    All other  "systems reviewed and are negative.  Breast: Negative for lump and mastodynia        Physical Examination:  Vitals:    08/14/24 1345   BP: 120/68   BP Location: Left arm   Patient Position: Sitting   BP Method: Medium (Manual)   Resp: 18   Weight: 52.7 kg (116 lb 4.7 oz)   Height: 5' 3" (1.6 m)        Physical Exam  Vitals reviewed.   Constitutional:       Appearance: Normal appearance.   HENT:      Head: Normocephalic and atraumatic.   Eyes:      Conjunctiva/sclera: Conjunctivae normal.   Cardiovascular:      Rate and Rhythm: Normal rate and regular rhythm.   Pulmonary:      Effort: Pulmonary effort is normal.      Breath sounds: Normal breath sounds. No wheezing.   Abdominal:      General: Abdomen is flat.      Palpations: Abdomen is soft.      Tenderness: There is no abdominal tenderness.   Musculoskeletal:         General: Normal range of motion.      Cervical back: Normal range of motion.   Skin:     General: Skin is warm and dry.   Neurological:      General: No focal deficit present.      Mental Status: She is alert and oriented to person, place, and time.   Psychiatric:         Mood and Affect: Mood normal.         Behavior: Behavior normal.         Thought Content: Thought content normal.         Judgment: Judgment normal.          Assessment:    1. Fibroid            Plan:  - Counseled that findings on CT appear benign.  No suspicious abnormality are present.  Calcification is most like a pedunculated fibroid.   - Offered pelvic US for further evaluation.  Patient declined at this time.  - Counseled on patient on GYN visit in the future   - If symptoms of bleeding/pain/bloating/early satiety does occur, patient to return immediately  - Encouraged patient to send messages to [employer] for any needs or questions               "

## 2024-08-28 ENCOUNTER — TELEPHONE (OUTPATIENT)
Dept: GASTROENTEROLOGY | Facility: CLINIC | Age: 69
End: 2024-08-28
Payer: MEDICARE

## 2024-08-28 NOTE — TELEPHONE ENCOUNTER
----- Message from Fior Meyers sent at 8/28/2024  3:51 PM CDT -----  Type:  Call Back     Who Called:pt     Does the patient know what this is regarding?:cancel procedure   Would the patient rather a call back or a response via [facility]? Call   Best Call Back Number: [phone]  Additional Information:     Pt stated she would like to cancel her procedure on 9/12

## 2024-09-03 ENCOUNTER — TELEPHONE (OUTPATIENT)
Dept: GASTROENTEROLOGY | Facility: CLINIC | Age: 69
End: 2024-09-03
Payer: MEDICARE

## 2024-09-03 DIAGNOSIS — Z12.11 SCREENING FOR MALIGNANT NEOPLASM OF COLON: Primary | ICD-10-CM

## 2024-09-03 NOTE — TELEPHONE ENCOUNTER
----- Message from Misael Suazo sent at 9/3/2024 10:08 AM CDT -----  Contact: Self  Type:  Sooner Appointment Request    Caller is requesting a sooner appointment.  Caller declined first available appointment listed below.  Caller will not accept being placed on the waitlist and is requesting a message be sent to doctor.    Name of Caller:  Patient  When is the first available appointment?  N/a  Symptoms:  c-scope  Would the patient rather a call back or a response via [facility]? Call  Best Call Back Number:  [phone]   Additional Information:   Called to r/s

## 2024-10-31 ENCOUNTER — TELEPHONE (OUTPATIENT)
Dept: GASTROENTEROLOGY | Facility: CLINIC | Age: 69
End: 2024-10-31
Payer: MEDICARE

## 2024-11-06 ENCOUNTER — HOSPITAL ENCOUNTER (OUTPATIENT)
Facility: HOSPITAL | Age: 69
Discharge: HOME | End: 2024-11-06
Attending: INTERNAL MEDICINE | Admitting: INTERNAL MEDICINE
Payer: MEDICARE

## 2024-11-06 ENCOUNTER — ANESTHESIA EVENT (OUTPATIENT)
Dept: ENDOSCOPY | Facility: HOSPITAL | Age: 69
End: 2024-11-06
Payer: MEDICARE

## 2024-11-06 ENCOUNTER — ANESTHESIA (OUTPATIENT)
Dept: ENDOSCOPY | Facility: HOSPITAL | Age: 69
End: 2024-11-06
Payer: MEDICARE

## 2024-11-06 DIAGNOSIS — Z12.11 SCREEN FOR COLON CANCER: ICD-10-CM

## 2024-11-06 PROCEDURE — 63600175 PHARM REV CODE 636 W HCPCS: Performed by: NURSE ANESTHETIST, CERTIFIED REGISTERED

## 2024-11-06 PROCEDURE — G0121 COLON CA SCRN NOT HI RSK IND: HCPCS | Mod: ,,, | Performed by: INTERNAL MEDICINE

## 2024-11-06 PROCEDURE — G0121 COLON CA SCRN NOT HI RSK IND: HCPCS | Performed by: INTERNAL MEDICINE

## 2024-11-06 PROCEDURE — 37000009 HC ANESTHESIA EA ADD 15 MINS: Performed by: INTERNAL MEDICINE

## 2024-11-06 PROCEDURE — 37000008 HC ANESTHESIA 1ST 15 MINUTES: Performed by: INTERNAL MEDICINE

## 2024-11-06 PROCEDURE — 25000003 PHARM REV CODE 250: Performed by: NURSE ANESTHETIST, CERTIFIED REGISTERED

## 2024-11-06 RX ORDER — LIDOCAINE HYDROCHLORIDE 20 MG/ML
INJECTION INTRAVENOUS
Status: DISCONTINUED | OUTPATIENT
Start: 2024-11-06 | End: 2024-11-06

## 2024-11-06 RX ORDER — PROPOFOL 10 MG/ML
VIAL (ML) INTRAVENOUS
Status: DISCONTINUED | OUTPATIENT
Start: 2024-11-06 | End: 2024-11-06

## 2024-11-06 RX ADMIN — SODIUM CHLORIDE: 0.9 INJECTION, SOLUTION INTRAVENOUS at 08:11

## 2024-11-06 RX ADMIN — PROPOFOL 30 MG: 10 INJECTION, EMULSION INTRAVENOUS at 08:11

## 2024-11-06 RX ADMIN — LIDOCAINE HYDROCHLORIDE 75 MG: 20 INJECTION, SOLUTION INTRAVENOUS at 08:11

## 2024-11-06 RX ADMIN — PROPOFOL 100 MG: 10 INJECTION, EMULSION INTRAVENOUS at 08:11

## 2024-11-06 NOTE — H&P
[facility]ology Note    CC: screening colonsocopy    HPI 69 y.o. female presents for routine screening colonoscopy, last colonoscopy 10 years ago    History reviewed. No pertinent past medical history.    Allergies and Medications reviewed     Review of Systems  General ROS: negative for - chills, fever or weight loss  Cardiovascular ROS: no chest pain or dyspnea on exertion  Gastrointestinal ROS: no blood in stool    Physical Examination  BP (!) 148/68 (BP Location: Left arm, Patient Position: Lying)   Pulse 70   Temp 98.4 °F (36.9 °C) (Skin)   Resp 16   SpO2 100%   Breastfeeding No   General appearance: alert, cooperative, no distress  HENT: Normocephalic, atraumatic, neck symmetrical, no nasal discharge, sclera anicteric   Lungs: clear to auscultation bilaterally, symmetric chest wall expansion bilaterally  Heart: regular rate and rhythm without rub; no displacement of the PMI   Abdomen: soft  Extremities: extremities symmetric; no clubbing, cyanosis, or edema        Labs:  Lab Results   Component Value Date    WBC 6.66 02/15/2024    HGB 13.7 02/15/2024    HCT 42.5 02/15/2024    MCV 92 02/15/2024     02/15/2024       Assessment:   69 y.o. female presents for colonoscopy    Plan:  -Proceed to colonoscopy     Emily Aleman MD  [facility]  [address], [address]  [place] [address]  Office: [phone]  Fax: [phone]

## 2024-11-06 NOTE — ANESTHESIA PREPROCEDURE EVALUATION
11/06/2024  Dayanara Flores is a 69 y.o., female.      Pre-op Assessment    I have reviewed the Patient Summary Reports.     I have reviewed the Nursing Notes. I have reviewed the NPO Status.   I have reviewed the Medications.     Review of Systems  Anesthesia Hx:  No problems with previous Anesthesia                Social:  Non-Smoker       Cardiovascular:  Cardiovascular Normal                                              Pulmonary:  Pulmonary Normal                       Renal/:  Renal/ Normal                 Musculoskeletal:     Osteoporosis              Neurological:  Neurology Normal                                      Endocrine:  Endocrine Normal                Physical Exam  General: Well nourished, Cooperative, Alert and Oriented    Airway:  Mallampati: II   Mouth Opening: Normal  TM Distance: Normal  Neck ROM: Normal ROM    Anesthesia Plan  Type of Anesthesia, risks & benefits discussed:    Anesthesia Type: Gen ETT, Gen Supraglottic Airway, Gen Natural Airway, MAC  Intra-op Monitoring Plan: Standard ASA Monitors  Post Op Pain Control Plan: multimodal analgesia  Induction:  IV  Airway Plan: Direct, Video and Fiberoptic, Post-Induction  Informed Consent: Informed consent signed with the Patient and all parties understand the risks and agree with anesthesia plan.  All questions answered.   ASA Score: 2  Anesthesia Plan Notes: Advanced age.    Ready For Surgery From Anesthesia Perspective.   .

## 2024-11-06 NOTE — PROVATION PATIENT INSTRUCTIONS
Discharge Summary/Instructions after an Endoscopic Procedure  Patient Name: Dayanara Flores  Patient MRN: [id number]  Patient YOB: 1955 Wednesday, November 6, 2024  Emily Aleman MD  Dear patient,  As a result of recent federal legislation (The Federal Cures Act), you may   receive lab or pathology results from your procedure in your [facility]   account before your physician is able to contact you. Your physician or   their representative will relay the results to you with their   recommendations at their soonest availability.  Thank you,  RESTRICTIONS:  During your procedure today, you received medications for sedation.  These   medications may affect your judgment, balance and coordination.  Therefore,   for 24 hours, you have the following restrictions:   - DO NOT drive a car, operate machinery, make legal/financial decisions,   sign important papers or drink alcohol.    ACTIVITY:  Today: no heavy lifting, straining or running due to procedural   sedation/anesthesia.  The following day: return to full activity including work.  DIET:  Eat and drink normally unless instructed otherwise.     TREATMENT FOR COMMON SIDE EFFECTS:  - Mild abdominal pain, nausea, belching, bloating or excessive gas:  rest,   eat lightly and use a heating pad.  - Sore Throat: treat with throat lozenges and/or gargle with warm salt   water.  - Because air was used during the procedure, expelling large amounts of air   from your rectum or belching is normal.  - If a bowel prep was taken, you may not have a bowel movement for 1-3 days.    This is normal.  SYMPTOMS TO WATCH FOR AND REPORT TO YOUR PHYSICIAN:  1. Abdominal pain or bloating, other than gas cramps.  2. Chest pain.  3. Back pain.  4. Signs of infection such as: chills or fever occurring within 24 hours   after the procedure.  5. Rectal bleeding, which would show as bright red, maroon, or black stools.   (A tablespoon of blood from the rectum is not serious,  especially if   hemorrhoids are present.)  6. Vomiting.  7. Weakness or dizziness.  GO DIRECTLY TO THE NEAREST EMERGENCY ROOM IF YOU HAVE ANY OF THE FOLLOWING:      Difficulty breathing              Chills and/or fever over 101 F   Persistent vomiting and/or vomiting blood   Severe abdominal pain   Severe chest pain   Black, tarry stools   Bleeding- more than one tablespoon   Any other symptom or condition that you feel may need urgent attention  Your doctor recommends these additional instructions:  If any biopsies were taken, your doctors clinic will contact you in 1 to 2   weeks with any results.  - Discharge patient to home (with escort).   - Patient has a contact number available for emergencies.  The signs and   symptoms of potential delayed complications were discussed with the   patient.  Return to normal activities tomorrow.  Written discharge   instructions were provided to the patient.   - Resume previous diet.   - Continue present medications.   - No repeat colonoscopy due to age and the absence of colonic polyps.   - Return to my office PRN.  For questions, problems or results please call your physician - Emily Aleman MD at Work:  [phone].  [facility] PHONE NUMBER: [phone]  IF A COMPLICATION OR EMERGENCY SITUATION ARISES AND YOU ARE UNABLE TO REACH   YOUR PHYSICIAN - GO DIRECTLY TO THE EMERGENCY ROOM.  Emily Aleman MD  11/6/2024 8:53:04 AM  This report has been verified and signed electronically.  Dear patient,  As a result of recent federal legislation (The Federal Cures Act), you may   receive lab or pathology results from your procedure in your [facility]   account before your physician is able to contact you. Your physician or   their representative will relay the results to you with their   recommendations at their soonest availability.  Thank you,  PROVATION

## 2024-11-06 NOTE — ANESTHESIA POSTPROCEDURE EVALUATION
Anesthesia Post Evaluation    Patient: Dayanara Flores    Procedure(s) Performed: Procedure(s) (LRB):  COLONOSCOPY (checking with daughter for ride 10/17) (N/A)    Final Anesthesia Type: general      Patient location during evaluation: PACU  Patient participation: Yes- Able to Participate  Level of consciousness: awake and alert and oriented  Post-procedure vital signs: reviewed and stable  Pain management: adequate  Airway patency: patent    PONV status at discharge: No PONV  Anesthetic complications: no      Cardiovascular status: blood pressure returned to baseline and stable  Respiratory status: unassisted and spontaneous ventilation  Hydration status: euvolemic  Follow-up not needed.              Vitals Value Taken Time   /60 11/06/24 0920   Temp 36.8 °C (98.2 °F) 11/06/24 0920   Pulse 64 11/06/24 0923   Resp 27 11/06/24 0923   SpO2 100 % 11/06/24 0923   Vitals shown include unfiled device data.      Event Time   Out of Recovery 09:35:58         Pain/Jay Score: Jay Score: 10 (11/6/2024  9:07 AM)

## 2024-11-06 NOTE — TRANSFER OF CARE
Anesthesia Transfer of Care Note    Patient: Dayanara [place]    Procedure(s) Performed: Procedure(s) (LRB):  COLONOSCOPY (checking with daughter for ride 10/17) (N/A)    Patient location: PACU    Anesthesia Type: general    Transport from OR: Transported from OR on room air with adequate spontaneous ventilation    Post pain: adequate analgesia    Post assessment: no apparent anesthetic complications and tolerated procedure well    Post vital signs: stable    Level of consciousness: sedated    Nausea/Vomiting: no nausea/vomiting    Complications: none    Transfer of care protocol was followed      Last vitals: Visit Vitals  BP (!) 148/68 (BP Location: Left arm, Patient Position: Lying)   Pulse 70   Temp 36.9 °C (98.4 °F) (Skin)   Resp 16   SpO2 100%   Breastfeeding No     
No

## 2024-11-11 VITALS
SYSTOLIC BLOOD PRESSURE: 131 MMHG | TEMPERATURE: 98 F | OXYGEN SATURATION: 100 % | RESPIRATION RATE: 34 BRPM | DIASTOLIC BLOOD PRESSURE: 60 MMHG | HEART RATE: 70 BPM

## 2025-04-02 ENCOUNTER — TELEPHONE (OUTPATIENT)
Dept: FAMILY MEDICINE | Facility: CLINIC | Age: 70
End: 2025-04-02
Payer: MEDICARE

## 2025-04-02 NOTE — TELEPHONE ENCOUNTER
Spoke with patient and let know would not have availabilty till June for new patient she said would call back tomorrow let us know if she going to keep the appointment she has or reschedule

## 2025-04-02 NOTE — TELEPHONE ENCOUNTER
----- Message from [facility] sent at 4/2/2025  3:29 PM CDT -----  Type:  Patient Returning CallWho Called:  Pilar Benson Message for Patient:  Jd the patient know what this is regarding?:  appointment timeBest Call Back Number:  [phone] Information:  johnny ruelas call , please call back thank you

## 2025-04-02 NOTE — TELEPHONE ENCOUNTER
----- Message from Ajaline sent at 4/2/2025  2:43 PM CDT -----  Type: Needs Medical AdviceWho Called:  Jeanne Call Back Number: [phone] Information: johnny states she needs to reschedule her 4/7 appointment to next available appointment , please call to further assist , thank you

## 2025-04-07 ENCOUNTER — OFFICE VISIT (OUTPATIENT)
Dept: FAMILY MEDICINE | Facility: CLINIC | Age: 70
End: 2025-04-07
Payer: MEDICARE

## 2025-04-07 VITALS
WEIGHT: 120 LBS | SYSTOLIC BLOOD PRESSURE: 114 MMHG | DIASTOLIC BLOOD PRESSURE: 62 MMHG | TEMPERATURE: 98 F | HEART RATE: 69 BPM | HEIGHT: 63 IN | OXYGEN SATURATION: 98 % | BODY MASS INDEX: 21.26 KG/M2

## 2025-04-07 DIAGNOSIS — Z00.00 PHYSICAL EXAM: Primary | ICD-10-CM

## 2025-04-07 DIAGNOSIS — Z90.49 S/P APPENDECTOMY: ICD-10-CM

## 2025-04-07 DIAGNOSIS — G31.84 MILD COGNITIVE IMPAIRMENT: ICD-10-CM

## 2025-04-07 DIAGNOSIS — E53.8 VITAMIN B 12 DEFICIENCY: ICD-10-CM

## 2025-04-07 DIAGNOSIS — M81.0 AGE-RELATED OSTEOPOROSIS WITHOUT CURRENT PATHOLOGICAL FRACTURE: ICD-10-CM

## 2025-04-07 DIAGNOSIS — Z87.891 FORMER SMOKER: ICD-10-CM

## 2025-04-07 DIAGNOSIS — Z12.31 SCREENING MAMMOGRAM FOR BREAST CANCER: ICD-10-CM

## 2025-04-07 DIAGNOSIS — R79.9 ABNORMAL FINDING OF BLOOD CHEMISTRY, UNSPECIFIED: ICD-10-CM

## 2025-04-07 DIAGNOSIS — Z80.0 FHX: PANCREATIC CANCER: ICD-10-CM

## 2025-04-07 PROCEDURE — 99999 PR PBB SHADOW E&M-EST. PATIENT-LVL III: CPT | Mod: PBBFAC,,, | Performed by: FAMILY MEDICINE

## 2025-04-07 PROCEDURE — 99213 OFFICE O/P EST LOW 20 MIN: CPT | Mod: PBBFAC,PN | Performed by: FAMILY MEDICINE

## 2025-04-07 RX ORDER — ALENDRONATE SODIUM 70 MG/1
70 TABLET ORAL
Qty: 12 TABLET | Refills: 1 | Status: SHIPPED | OUTPATIENT
Start: 2025-04-07

## 2025-04-07 RX ORDER — ALENDRONATE SODIUM 70 MG/1
70 TABLET ORAL
COMMUNITY
End: 2025-04-07 | Stop reason: SDUPTHER

## 2025-04-07 NOTE — PROGRESS NOTES
Subjective:       Patient ID: Dayanara Flores is a 69 y.o. female.    Chief Complaint: Establish Care (Sue Bustillos)    New patient visit was seeing Dr. Bustillos.  Former smoker quit 20 years ago.  Colonoscopy .  New repeat recommended.  Status post appendectomy.   1 para 1 ab 0.  Daughter lives here.  She recently moved here from [place].  Has osteoporosis-3.1- 2.4- 2.4 she was not aware of this.  Breast cancer screening is due.  There is extensive family history of pancreatic cancer.  She has had little mild cognitive impairment.    Social history former smoker some 20 years ago.  Moved here from [place] 1-1/2 years ago.  Retired .  No significant alcohol intake.      Family history 2 siblings with pancreatic cancer .  1 sibling with breast cancer.  One brother also with pancreatic cancer.  Seven sisters and 3 brothers total.  Mother  at age 94.  Father  at a young age.      Immunizations tetanus diptheria shot believes it is current.  Had her pneumococcal vaccine in [place].    Review of systems.  In general she is feeling okay.  Pulmonary no cough or sputum.  Cardiovascular no chest pain palpitations PND orthopnea.  GI no nausea vomiting diarrhea melena hematemesis.    Physical examination.  Vital signs noted.  No acute distress.  Pupils are equal round regular.  Tympanic membranes without erythema pharynx without erythema no cervical adenopathy no bruit.  Chest clear.  Heart regular rate and rhythm.  Abdomen bowel sounds are positive soft nontender no guarding no rebound.        Objective:        Assessment:       1. Physical exam    2. Mild cognitive impairment    3. FHx: pancreatic cancer    4. Screening mammogram for breast cancer    5. Age-related osteoporosis without current pathological fracture    6. S/P appendectomy    7. Former smoker    8. Vitamin B 12 deficiency    9. Abnormal finding of blood chemistry, unspecified        Plan:       Physical exam  -      Hepatitis C Antibody; Future; Expected date: 04/07/2025  -     Vitamin D; Future; Expected date: 04/07/2025  -     CBC Auto Differential; Future; Expected date: 04/07/2025  -     Comprehensive Metabolic Panel; Future; Expected date: 04/07/2025  -     Lipid Panel; Future; Expected date: 04/07/2025  -     TSH; Future; Expected date: 04/07/2025  -     Vitamin B12; Future; Expected date: 04/07/2025    Mild cognitive impairment  -     CBC Auto Differential; Future; Expected date: 04/07/2025  -     TSH; Future; Expected date: 04/07/2025    FHx: pancreatic cancer    Screening mammogram for breast cancer  -     Mammo Digital Screening Bilat; Future; Expected date: 04/07/2025    Age-related osteoporosis without current pathological fracture  -     Vitamin D; Future; Expected date: 04/07/2025    S/P appendectomy    Former smoker    Vitamin B 12 deficiency  -     Vitamin B12; Future; Expected date: 04/07/2025    Abnormal finding of blood chemistry, unspecified  -     Lipid Panel; Future; Expected date: 04/07/2025    Other orders  -     alendronate (FOSAMAX) 70 MG tablet; Take 1 tablet (70 mg total) by mouth every 7 days.  Dispense: 12 tablet; Refill: 1    Mammogram ordered.  Hepatitis-C antibody vitamin-D level CBC CMP lipids TSH B12 level ordered.  Fosamax 70 weekly calcium 600 b.i.d. vitamin-D 2000 per day.  Will order CT of the abdomen pelvis with contrast due to the family history of pancreatic cancer.

## 2025-04-08 ENCOUNTER — RESULTS FOLLOW-UP (OUTPATIENT)
Dept: FAMILY MEDICINE | Facility: CLINIC | Age: 70
End: 2025-04-08
Payer: MEDICARE

## 2025-04-08 ENCOUNTER — LAB VISIT (OUTPATIENT)
Dept: LAB | Facility: HOSPITAL | Age: 70
End: 2025-04-08
Attending: FAMILY MEDICINE
Payer: MEDICARE

## 2025-04-08 DIAGNOSIS — M81.0 AGE-RELATED OSTEOPOROSIS WITHOUT CURRENT PATHOLOGICAL FRACTURE: ICD-10-CM

## 2025-04-08 DIAGNOSIS — Z00.00 PHYSICAL EXAM: ICD-10-CM

## 2025-04-08 DIAGNOSIS — G31.84 MILD COGNITIVE IMPAIRMENT: ICD-10-CM

## 2025-04-08 DIAGNOSIS — E53.8 VITAMIN B 12 DEFICIENCY: ICD-10-CM

## 2025-04-08 DIAGNOSIS — R79.9 ABNORMAL FINDING OF BLOOD CHEMISTRY, UNSPECIFIED: ICD-10-CM

## 2025-04-08 LAB
25(OH)D3+25(OH)D2 SERPL-MCNC: 52 NG/ML (ref 30–96)
ABSOLUTE EOSINOPHIL (SMH): 0.03 K/UL
ABSOLUTE MONOCYTE (SMH): 0.18 K/UL (ref 0.3–1)
ABSOLUTE NEUTROPHIL COUNT (SMH): 1.8 K/UL (ref 1.8–7.7)
ALBUMIN SERPL-MCNC: 4.2 G/DL (ref 3.5–5.2)
ALP SERPL-CCNC: 65 UNIT/L (ref 55–135)
ALT SERPL-CCNC: 13 UNIT/L (ref 10–44)
ANION GAP (SMH): 6 MMOL/L (ref 8–16)
AST SERPL-CCNC: 17 UNIT/L (ref 10–40)
BASOPHILS # BLD AUTO: 0.02 K/UL
BASOPHILS NFR BLD AUTO: 0.6 %
BILIRUB SERPL-MCNC: 0.4 MG/DL (ref 0.1–1)
BUN SERPL-MCNC: 15 MG/DL (ref 8–23)
CALCIUM SERPL-MCNC: 9.4 MG/DL (ref 8.7–10.5)
CHLORIDE SERPL-SCNC: 103 MMOL/L (ref 95–110)
CHOLEST SERPL-MCNC: 203 MG/DL (ref 120–199)
CHOLEST/HDLC SERPL: 2.2 {RATIO} (ref 2–5)
CO2 SERPL-SCNC: 29 MMOL/L (ref 23–29)
CREAT SERPL-MCNC: 0.7 MG/DL (ref 0.5–1.4)
ERYTHROCYTE [DISTWIDTH] IN BLOOD BY AUTOMATED COUNT: 13.2 % (ref 11.5–14.5)
GFR SERPLBLD CREATININE-BSD FMLA CKD-EPI: >60 ML/MIN/1.73/M2
GLUCOSE SERPL-MCNC: 103 MG/DL (ref 70–110)
HCT VFR BLD AUTO: 40.6 % (ref 37–48.5)
HCV AB SERPL QL IA: NORMAL
HDLC SERPL-MCNC: 92 MG/DL (ref 40–75)
HDLC SERPL: 45.3 % (ref 20–50)
HGB BLD-MCNC: 13.3 GM/DL (ref 12–16)
IMM GRANULOCYTES # BLD AUTO: 0.01 K/UL (ref 0–0.04)
IMM GRANULOCYTES NFR BLD AUTO: 0.3 % (ref 0–0.5)
LDLC SERPL CALC-MCNC: 100.2 MG/DL (ref 63–159)
LYMPHOCYTES # BLD AUTO: 1.36 K/UL (ref 1–4.8)
MCH RBC QN AUTO: 29.9 PG (ref 27–31)
MCHC RBC AUTO-ENTMCNC: 32.8 G/DL (ref 32–36)
MCV RBC AUTO: 91 FL (ref 82–98)
NONHDLC SERPL-MCNC: 111 MG/DL
NUCLEATED RBC (/100WBC) (SMH): 0 /100 WBC
PLATELET # BLD AUTO: 158 K/UL (ref 150–450)
PMV BLD AUTO: 11.8 FL (ref 9.2–12.9)
POTASSIUM SERPL-SCNC: 4 MMOL/L (ref 3.5–5.1)
PROT SERPL-MCNC: 7.4 GM/DL (ref 6–8.4)
RBC # BLD AUTO: 4.45 M/UL (ref 4–5.4)
RELATIVE EOSINOPHIL (SMH): 0.9 % (ref 0–8)
RELATIVE LYMPHOCYTE (SMH): 40.1 % (ref 18–48)
RELATIVE MONOCYTE (SMH): 5.3 % (ref 4–15)
RELATIVE NEUTROPHIL (SMH): 52.8 % (ref 38–73)
SODIUM SERPL-SCNC: 138 MMOL/L (ref 136–145)
TRIGL SERPL-MCNC: 54 MG/DL (ref 30–150)
TSH SERPL-ACNC: 1.31 UIU/ML (ref 0.34–5.6)
VIT B12 SERPL-MCNC: >1500 PG/ML (ref 210–950)
WBC # BLD AUTO: 3.39 K/UL (ref 3.9–12.7)

## 2025-04-08 PROCEDURE — 82465 ASSAY BLD/SERUM CHOLESTEROL: CPT

## 2025-04-08 PROCEDURE — 84443 ASSAY THYROID STIM HORMONE: CPT

## 2025-04-08 PROCEDURE — 36415 COLL VENOUS BLD VENIPUNCTURE: CPT

## 2025-04-08 PROCEDURE — 85025 COMPLETE CBC W/AUTO DIFF WBC: CPT

## 2025-04-08 PROCEDURE — 82607 VITAMIN B-12: CPT

## 2025-04-08 PROCEDURE — 84460 ALANINE AMINO (ALT) (SGPT): CPT

## 2025-04-08 PROCEDURE — 86803 HEPATITIS C AB TEST: CPT

## 2025-04-08 PROCEDURE — 82306 VITAMIN D 25 HYDROXY: CPT

## 2025-04-09 ENCOUNTER — TELEPHONE (OUTPATIENT)
Dept: FAMILY MEDICINE | Facility: CLINIC | Age: 70
End: 2025-04-09
Payer: MEDICARE

## 2025-04-10 ENCOUNTER — HOSPITAL ENCOUNTER (OUTPATIENT)
Dept: RADIOLOGY | Facility: CLINIC | Age: 70
Discharge: HOME | End: 2025-04-10
Attending: FAMILY MEDICINE
Payer: MEDICARE

## 2025-04-10 DIAGNOSIS — Z12.31 SCREENING MAMMOGRAM FOR BREAST CANCER: ICD-10-CM

## 2025-04-10 PROCEDURE — 77063 BREAST TOMOSYNTHESIS BI: CPT | Mod: TC,PO

## 2025-04-10 PROCEDURE — 77067 SCR MAMMO BI INCL CAD: CPT | Mod: 26,,, | Performed by: RADIOLOGY

## 2025-04-10 PROCEDURE — 77063 BREAST TOMOSYNTHESIS BI: CPT | Mod: 26,,, | Performed by: RADIOLOGY

## 2025-04-28 DIAGNOSIS — Z00.00 ENCOUNTER FOR MEDICARE ANNUAL WELLNESS EXAM: ICD-10-CM

## 2025-05-01 ENCOUNTER — TELEPHONE (OUTPATIENT)
Dept: FAMILY MEDICINE | Facility: CLINIC | Age: 70
End: 2025-05-01
Payer: MEDICARE

## 2025-05-01 NOTE — TELEPHONE ENCOUNTER
"----- Message from Lizz sent at 4/30/2025  1:05 PM CDT -----  Contact: pt  Type: Needs Medical AdviceWho Called:Chidi Call Back Number:[phone] Information: Requesting a call back regarding pt was given rx  alendronate (FOSAMAX) 70 MG tablet but pt is asking if she can have something more natural called " bone up". Please Advise- Thank you  "

## 2025-05-05 ENCOUNTER — TELEPHONE (OUTPATIENT)
Dept: FAMILY MEDICINE | Facility: CLINIC | Age: 70
End: 2025-05-05
Payer: MEDICARE

## 2025-05-05 NOTE — TELEPHONE ENCOUNTER
----- Message from Sherlyn sent at 5/2/2025 10:37 AM CDT -----  Type:  Patient Returning CallWho Called:pt Who Left Message for Patient:Does the patient know what this is regarding?:appt Would the patient rather a call back or a response via [facility]? Call [facility] Call Back Number:[phone] Additional Information:   Please call back to advise. Thank you.

## 2025-05-05 NOTE — TELEPHONE ENCOUNTER
----- Message from Betsy sent at 5/5/2025  9:31 AM CDT -----  Type: Needs Medical AdviceWho Called:  ptBest Call Back Number: [phone] Additional Information: pt requesting a call back from Dr. Venegas about meds and wants to confirm dosage

## 2025-07-03 ENCOUNTER — OFFICE VISIT (OUTPATIENT)
Dept: FAMILY MEDICINE | Facility: CLINIC | Age: 70
End: 2025-07-03
Payer: MEDICARE

## 2025-07-03 VITALS
SYSTOLIC BLOOD PRESSURE: 106 MMHG | TEMPERATURE: 98 F | HEIGHT: 63 IN | WEIGHT: 119.06 LBS | DIASTOLIC BLOOD PRESSURE: 64 MMHG | BODY MASS INDEX: 21.09 KG/M2 | HEART RATE: 69 BPM | OXYGEN SATURATION: 98 %

## 2025-07-03 DIAGNOSIS — M79.642 LEFT HAND PAIN: ICD-10-CM

## 2025-07-03 DIAGNOSIS — Z00.00 ENCOUNTER FOR MEDICARE ANNUAL WELLNESS EXAM: Primary | ICD-10-CM

## 2025-07-03 PROCEDURE — 99214 OFFICE O/P EST MOD 30 MIN: CPT | Mod: PBBFAC,PO | Performed by: NURSE PRACTITIONER

## 2025-07-03 PROCEDURE — 99999 PR PBB SHADOW E&M-EST. PATIENT-LVL IV: CPT | Mod: PBBFAC,,, | Performed by: NURSE PRACTITIONER

## 2025-07-03 NOTE — PATIENT INSTRUCTIONS
Counseling and Referral of Other Preventative  (Italic type indicates deductible and co-insurance are waived)    Patient Name: Dayanara Flores  Today's Date: 7/3/2025    Health Maintenance       Date Due Completion Date    TETANUS VACCINE Never done ---    Shingles Vaccine (1 of 2) Never done ---    Pneumococcal Vaccines (Age 50+) (1 of 1 - PCV) Never done ---    RSV Vaccine (Age 60+ and Pregnant patients) (1 - Risk 60-74 years 1-dose series) Never done ---    COVID-19 Vaccine (1 - 2024-25 season) Never done ---    Influenza Vaccine (1) 09/01/2025 10/11/2024    DEXA Scan 02/15/2026 2/15/2024    Mammogram 04/10/2026 4/10/2025    Lipid Panel 04/08/2030 4/8/2025    Colorectal Cancer Screening 11/06/2034 11/6/2024        No orders of the defined types were placed in this encounter.    The following information is provided to all patients.  This information is to help you find resources for any of the problems found today that may be affecting your health:                  Living healthy guide: www.[facility].[place].gov      Understanding Diabetes: www.diabetes.org      Eating healthy: www.cdc.gov/healthyweight      CDC home safety checklist: www.cdc.gov/steadi/patient.html      Agency on Aging: www.goea.[place].[facility]      Alcoholics anonymous (AA): www.aa.org      Physical Activity: www.chano.nih.gov/ln2ptog      Tobacco use: www.quitwithusla.org

## 2025-07-03 NOTE — PROGRESS NOTES
"  Dayanara Flores presented for a  Medicare AWV and comprehensive Health Risk Assessment today. The following components were reviewed and updated:    Medical history  Family History  Social history  Allergies and Current Medications  Health Risk Assessment  Health Maintenance  Care Team         ** See Completed Assessments for Annual Wellness Visit within the encounter summary.**         The following assessments were completed:  Living Situation  CAGE  Depression Screening  Timed Get Up and Go  Whisper Test  Cognitive Function Screening  Nutrition Screening  ADL Screening  PAQ Screening    Clock in media   Opioid documentation:      Patient does not have a current opioid prescription.        Vitals:    07/03/25 0936   BP: 106/64   Pulse: 69   Temp: 98.3 °F (36.8 °C)   TempSrc: Oral   SpO2: 98%   Weight: 54 kg (119 lb 0.8 oz)   Height: 5' 3" (1.6 m)     Body mass index is 21.09 kg/m².  Physical Exam  Constitutional:       Appearance: She is well-developed.   HENT:      Head: Normocephalic and atraumatic.      Nose: Nose normal.   Eyes:      General: Lids are normal.      Conjunctiva/sclera: Conjunctivae normal.   Cardiovascular:      Rate and Rhythm: Normal rate.   Pulmonary:      Effort: Pulmonary effort is normal.   Neurological:      Mental Status: She is alert and oriented to person, place, and time.   Psychiatric:         Speech: Speech normal.         Behavior: Behavior normal.               Diagnoses and health risks identified today and associated recommendations/orders:    1. Encounter for Medicare annual wellness exam  Discussed health maintenance guidelines appropriate for age.    Patient to check with pharmacy regarding vaccines   - Referral to Enhanced Annual Wellness Visit (eAWV) W+1    2. Left hand pain    - Ambulatory referral/consult to Orthopedics; Future      Provided Dayanara with a 5-10 year written screening schedule and personal prevention plan. Recommendations were developed using the USPSTF age " appropriate recommendations. Education, counseling, and referrals were provided as needed. After Visit Summary printed and given to patient which includes a list of additional screenings\tests needed.    Follow up for One year for Annual Wellness Visit.    Aydee Mathis NP      I offered to discuss advanced care planning, including how to pick a person who would make decisions for you if you were unable to make them for yourself, called a health care power of , and what kind of decisions you might make such as use of life sustaining treatments such as ventilators and tube feeding when faced with a life limiting illness recorded on a living will that they will need to know. (How you want to be cared for as you near the end of your natural life)     X Patient is interested in learning more about how to make advanced directives.  I provided them paperwork and offered to discuss this with them.

## 2025-07-08 DIAGNOSIS — M79.642 LEFT HAND PAIN: Primary | ICD-10-CM

## 2025-07-10 RX ORDER — ALENDRONATE SODIUM 70 MG/1
70 TABLET ORAL
Qty: 12 TABLET | Refills: 1 | Status: SHIPPED | OUTPATIENT
Start: 2025-07-10

## 2025-07-11 ENCOUNTER — OFFICE VISIT (OUTPATIENT)
Dept: ORTHOPEDICS | Facility: CLINIC | Age: 70
End: 2025-07-11
Payer: MEDICARE

## 2025-07-11 ENCOUNTER — HOSPITAL ENCOUNTER (OUTPATIENT)
Dept: RADIOLOGY | Facility: HOSPITAL | Age: 70
Discharge: HOME | End: 2025-07-11
Attending: ORTHOPAEDIC SURGERY
Payer: MEDICARE

## 2025-07-11 VITALS — BODY MASS INDEX: 21.09 KG/M2 | HEIGHT: 63 IN | WEIGHT: 119 LBS

## 2025-07-11 DIAGNOSIS — M79.642 LEFT HAND PAIN: ICD-10-CM

## 2025-07-11 PROCEDURE — 99999 PR PBB SHADOW E&M-EST. PATIENT-LVL III: CPT | Mod: PBBFAC,,, | Performed by: ORTHOPAEDIC SURGERY

## 2025-07-11 PROCEDURE — 99213 OFFICE O/P EST LOW 20 MIN: CPT | Mod: PBBFAC,PO | Performed by: ORTHOPAEDIC SURGERY

## 2025-07-11 PROCEDURE — 73130 X-RAY EXAM OF HAND: CPT | Mod: TC,LT

## 2025-07-11 PROCEDURE — 73130 X-RAY EXAM OF HAND: CPT | Mod: 26,LT,, | Performed by: RADIOLOGY

## 2025-07-11 NOTE — PROGRESS NOTES
"Subjective:      Patient ID: Dayanara Flores is a 70 y.o. female.    Chief Complaint: Pain of the Left Hand ("Bone that's sticking up in palm of hand")    HPI  70-year-old female describes what she feels like his bone that is sticking up out of the hand.  She has noticed a nodule that is somewhat tender at times.  She has has no trauma been no fevers or chills or redness.  She has had no treatment for this.  ROS      Objective:    Ortho Exam     Constitutional:   Patient is alert  and oriented in no acute distress  HEENT:  normocephalic atraumatic; PERRL EOMI  Neck:  Supple without adenopathy  Cardiovascular:  Normal rate and rhythm  Pulmonary:  Normal respiratory effort normal chest wall expansion  Abdominal:  Nonprotuberant nondistended  Musculoskeletal:  The patient has what appeared to be a Dupuytren's cord overlying the midportion of the left-hand  She has no flexion contractures minimally tender no erythema Good flexor and extensor tendon function of all digits  Neurological:  No focal defect; cranial nerves 2-12 grossly intact  Psychiatric/behavioral:  Mood and behavior normal      X-Ray Hand Complete Left  Narrative: EXAMINATION:  XR HAND COMPLETE 3 VIEW LEFT    CLINICAL HISTORY:  . Pain in left hand    TECHNIQUE:  PA, lateral, and oblique views of the left hand were performed.    COMPARISON:  None    FINDINGS:  There is no fracture or dislocation.  Mild degenerative changes are present.  Impression: No acute osseous abnormality.    Electronically signed by: Gerard Warren MD  Date:    07/11/2025  Time:    09:55       My Radiographs Findings:    Radiographs left hand without acute osseous abnormalities  Assessment:       Encounter Diagnosis   Name Primary?    Left hand pain          Plan:       I have discussed medical condition treatment options her at length.  I have explained this probably has a Dupuytren's cord and with the explained the significance of this.  I have discussed close observation gentle " stretching exercises if she develops any contracture of the digits or increased pain to follow up with us at her convenience.  Activities as tolerated.        History reviewed. No pertinent past medical history.  Past Surgical History:   Procedure Laterality Date    APPENDECTOMY      COLONOSCOPY      10 years ago in [place]    COLONOSCOPY N/A 11/6/2024    Procedure: COLONOSCOPY (checking with daughter for ride 10/17);  Surgeon: Emily Aleman MD;  Location: [facility];  Service: Endoscopy;  Laterality: N/A;    UPPER GASTROINTESTINAL ENDOSCOPY      10+ years ago       Current Medications[1]    Review of patient's allergies indicates:  No Known Allergies    Family History   Problem Relation Name Age of Onset    Cancer Sister          pancreatic    Pancreatic cancer Sister      Cancer Sister          breast    Breast cancer Sister      Cancer Brother          pancreatic    Pancreatic cancer Brother      Colon cancer Neg Hx      Esophageal cancer Neg Hx      Liver cancer Neg Hx      Stomach cancer Neg Hx       Social History     Occupational History    Not on file   Tobacco Use    Smoking status: Never    Smokeless tobacco: Former    Tobacco comments:     Quit 15-20yrs ago   Substance and Sexual Activity    Alcohol use: Yes     Comment: occasional    Drug use: Never    Sexual activity: Not on file            [1]   Current Outpatient Medications:     alendronate (FOSAMAX) 70 MG tablet, Take 1 tablet (70 mg total) by mouth every 7 days., Disp: 12 tablet, Rfl: 1    calcium carbonate (OS-ILA) 600 mg calcium (1,500 mg) Tab, Take 600 mg by mouth 2 (two) times daily with meals., Disp: , Rfl:     cyanocobalamin (VITAMIN B-12) 1000 MCG tablet, Take 100 mcg by mouth once daily., Disp: , Rfl:     glucosamine-chondroitin 500-400 mg tablet, Take 1 tablet by mouth 2 (two) times a day., Disp: , Rfl:     vitamin D (VITAMIN D3) 1000 units Tab, Take 1,000 Units by mouth once daily., Disp: , Rfl:

## 2025-08-26 ENCOUNTER — OFFICE VISIT (OUTPATIENT)
Dept: OBSTETRICS AND GYNECOLOGY | Facility: CLINIC | Age: 70
End: 2025-08-26
Payer: MEDICARE

## 2025-08-26 VITALS
BODY MASS INDEX: 21.76 KG/M2 | SYSTOLIC BLOOD PRESSURE: 108 MMHG | HEIGHT: 63 IN | HEART RATE: 66 BPM | OXYGEN SATURATION: 98 % | WEIGHT: 122.81 LBS | DIASTOLIC BLOOD PRESSURE: 62 MMHG

## 2025-08-26 DIAGNOSIS — R30.0 DYSURIA: ICD-10-CM

## 2025-08-26 DIAGNOSIS — Z01.419 WELL WOMAN EXAM WITH ROUTINE GYNECOLOGICAL EXAM: Primary | ICD-10-CM

## 2025-08-26 LAB
BILIRUBIN, UA POC OHS: NEGATIVE
BLOOD, UA POC OHS: ABNORMAL
CLARITY, UA POC OHS: CLEAR
COLOR, UA POC OHS: YELLOW
GLUCOSE, UA POC OHS: NEGATIVE
KETONES, UA POC OHS: NEGATIVE
LEUKOCYTES, UA POC OHS: NEGATIVE
NITRITE, UA POC OHS: NEGATIVE
PH, UA POC OHS: 6
PROTEIN, UA POC OHS: NEGATIVE
SPECIFIC GRAVITY, UA POC OHS: 1.01
UROBILINOGEN, UA POC OHS: 0.2

## 2025-08-26 PROCEDURE — 81003 URINALYSIS AUTO W/O SCOPE: CPT | Mod: PBBFAC,PO | Performed by: FAMILY MEDICINE

## 2025-08-26 PROCEDURE — 99999PBSHW POCT URINALYSIS(INSTRUMENT): Mod: PBBFAC,,,

## 2025-08-26 PROCEDURE — 87086 URINE CULTURE/COLONY COUNT: CPT | Performed by: FAMILY MEDICINE

## 2025-08-26 PROCEDURE — 99213 OFFICE O/P EST LOW 20 MIN: CPT | Mod: PBBFAC,PO | Performed by: FAMILY MEDICINE

## 2025-08-26 PROCEDURE — G0101 CA SCREEN;PELVIC/BREAST EXAM: HCPCS | Mod: PBBFAC,PO | Performed by: FAMILY MEDICINE

## 2025-08-26 PROCEDURE — 99999 PR PBB SHADOW E&M-EST. PATIENT-LVL III: CPT | Mod: PBBFAC,,, | Performed by: FAMILY MEDICINE

## 2025-09-02 ENCOUNTER — TELEPHONE (OUTPATIENT)
Dept: OBSTETRICS AND GYNECOLOGY | Facility: CLINIC | Age: 70
End: 2025-09-02
Payer: MEDICARE

## 2025-09-02 DIAGNOSIS — R30.0 DYSURIA: Primary | ICD-10-CM

## 2025-09-03 ENCOUNTER — CLINICAL SUPPORT (OUTPATIENT)
Dept: OBSTETRICS AND GYNECOLOGY | Facility: CLINIC | Age: 70
End: 2025-09-03
Payer: MEDICARE

## 2025-09-03 DIAGNOSIS — R30.0 DYSURIA: ICD-10-CM

## 2025-09-03 LAB
BACTERIA #/AREA URNS AUTO: NORMAL /HPF
BILIRUB UR QL STRIP.AUTO: NEGATIVE
CLARITY UR: CLEAR
COLOR UR AUTO: YELLOW
GLUCOSE UR QL STRIP: NEGATIVE
HGB UR QL STRIP: ABNORMAL
KETONES UR QL STRIP: NEGATIVE
LEUKOCYTE ESTERASE UR QL STRIP: ABNORMAL
MICROSCOPIC COMMENT: NORMAL
NITRITE UR QL STRIP: NEGATIVE
PH UR STRIP: 7 [PH]
PROT UR QL STRIP: NEGATIVE
RBC #/AREA URNS AUTO: 3 /HPF (ref 0–4)
SP GR UR STRIP: 1.01
SQUAMOUS #/AREA URNS AUTO: <1 /HPF
UROBILINOGEN UR STRIP-ACNC: NEGATIVE EU/DL
WBC #/AREA URNS AUTO: 3 /HPF (ref 0–5)

## 2025-09-03 PROCEDURE — 81001 URINALYSIS AUTO W/SCOPE: CPT

## 2025-09-03 PROCEDURE — 87186 SC STD MICRODIL/AGAR DIL: CPT

## 2025-09-06 LAB — BACTERIA UR CULT: ABNORMAL
